# Patient Record
Sex: MALE | Race: BLACK OR AFRICAN AMERICAN | NOT HISPANIC OR LATINO | Employment: OTHER | URBAN - METROPOLITAN AREA
[De-identification: names, ages, dates, MRNs, and addresses within clinical notes are randomized per-mention and may not be internally consistent; named-entity substitution may affect disease eponyms.]

---

## 2021-10-01 ENCOUNTER — OFFICE VISIT (OUTPATIENT)
Dept: UROLOGY | Facility: CLINIC | Age: 71
End: 2021-10-01
Payer: OTHER GOVERNMENT

## 2021-10-01 VITALS — BODY MASS INDEX: 32.07 KG/M2 | HEIGHT: 71 IN | WEIGHT: 229.06 LBS

## 2021-10-01 DIAGNOSIS — N39.8 VOIDING DYSFUNCTION: ICD-10-CM

## 2021-10-01 DIAGNOSIS — N40.1 BPH ASSOCIATED WITH NOCTURIA: Primary | ICD-10-CM

## 2021-10-01 DIAGNOSIS — R35.1 BPH ASSOCIATED WITH NOCTURIA: Primary | ICD-10-CM

## 2021-10-01 DIAGNOSIS — Z12.5 PROSTATE CANCER SCREENING: ICD-10-CM

## 2021-10-01 LAB
BILIRUB SERPL-MCNC: NORMAL MG/DL
BLOOD URINE, POC: NORMAL
CLARITY, POC UA: CLEAR
COLOR, POC UA: YELLOW
GLUCOSE UR QL STRIP: NORMAL
KETONES UR QL STRIP: NORMAL
LEUKOCYTE ESTERASE URINE, POC: NORMAL
NITRITE, POC UA: NORMAL
PH, POC UA: 5
PROTEIN, POC: NORMAL
SPECIFIC GRAVITY, POC UA: 1020
UROBILINOGEN, POC UA: NORMAL

## 2021-10-01 PROCEDURE — 99204 OFFICE O/P NEW MOD 45 MIN: CPT | Mod: PBBFAC | Performed by: STUDENT IN AN ORGANIZED HEALTH CARE EDUCATION/TRAINING PROGRAM

## 2021-10-01 PROCEDURE — 99999 PR PBB SHADOW E&M-NEW PATIENT-LVL IV: ICD-10-PCS | Mod: PBBFAC,,, | Performed by: STUDENT IN AN ORGANIZED HEALTH CARE EDUCATION/TRAINING PROGRAM

## 2021-10-01 PROCEDURE — 81002 URINALYSIS NONAUTO W/O SCOPE: CPT | Mod: PBBFAC | Performed by: STUDENT IN AN ORGANIZED HEALTH CARE EDUCATION/TRAINING PROGRAM

## 2021-10-01 PROCEDURE — 99204 OFFICE O/P NEW MOD 45 MIN: CPT | Mod: S$PBB,,, | Performed by: STUDENT IN AN ORGANIZED HEALTH CARE EDUCATION/TRAINING PROGRAM

## 2021-10-01 PROCEDURE — 99204 PR OFFICE/OUTPT VISIT, NEW, LEVL IV, 45-59 MIN: ICD-10-PCS | Mod: S$PBB,,, | Performed by: STUDENT IN AN ORGANIZED HEALTH CARE EDUCATION/TRAINING PROGRAM

## 2021-10-01 PROCEDURE — 99999 PR PBB SHADOW E&M-NEW PATIENT-LVL IV: CPT | Mod: PBBFAC,,, | Performed by: STUDENT IN AN ORGANIZED HEALTH CARE EDUCATION/TRAINING PROGRAM

## 2021-10-01 RX ORDER — ATORVASTATIN CALCIUM 80 MG/1
80 TABLET, FILM COATED ORAL DAILY
COMMUNITY

## 2021-10-01 RX ORDER — LOSARTAN POTASSIUM 50 MG/1
1 TABLET ORAL
COMMUNITY
Start: 2021-06-15 | End: 2021-10-01 | Stop reason: SDUPTHER

## 2021-10-01 RX ORDER — LOSARTAN POTASSIUM 100 MG/1
100 TABLET ORAL DAILY
COMMUNITY

## 2021-10-01 RX ORDER — ACETAMINOPHEN 500 MG
500 TABLET ORAL 3 TIMES DAILY
COMMUNITY

## 2021-10-01 RX ORDER — DICLOFENAC SODIUM 10 MG/G
2 GEL TOPICAL DAILY
COMMUNITY

## 2021-10-01 RX ORDER — FAMOTIDINE 20 MG/1
20 TABLET, FILM COATED ORAL 2 TIMES DAILY
COMMUNITY

## 2021-10-01 RX ORDER — SODIUM BICARBONATE 650 MG/1
650 TABLET ORAL
COMMUNITY

## 2021-10-01 RX ORDER — TAMSULOSIN HYDROCHLORIDE 0.4 MG/1
1 CAPSULE ORAL DAILY
Status: ON HOLD | COMMUNITY
End: 2022-03-22

## 2021-10-05 ENCOUNTER — HOSPITAL ENCOUNTER (OUTPATIENT)
Dept: RADIOLOGY | Facility: HOSPITAL | Age: 71
Discharge: HOME | End: 2021-10-05
Attending: STUDENT IN AN ORGANIZED HEALTH CARE EDUCATION/TRAINING PROGRAM
Payer: OTHER GOVERNMENT

## 2021-10-05 ENCOUNTER — HOSPITAL ENCOUNTER (OUTPATIENT)
Dept: PREADMISSION TESTING | Facility: HOSPITAL | Age: 71
Discharge: HOME | End: 2021-10-05
Attending: STUDENT IN AN ORGANIZED HEALTH CARE EDUCATION/TRAINING PROGRAM
Payer: OTHER GOVERNMENT

## 2021-10-05 VITALS
RESPIRATION RATE: 18 BRPM | BODY MASS INDEX: 32.16 KG/M2 | WEIGHT: 229.75 LBS | TEMPERATURE: 97 F | HEIGHT: 71 IN | OXYGEN SATURATION: 98 % | DIASTOLIC BLOOD PRESSURE: 79 MMHG | SYSTOLIC BLOOD PRESSURE: 145 MMHG | HEART RATE: 66 BPM

## 2021-10-05 DIAGNOSIS — R35.1 BPH ASSOCIATED WITH NOCTURIA: ICD-10-CM

## 2021-10-05 DIAGNOSIS — Z11.52 ENCOUNTER FOR PREOPERATIVE SCREENING LABORATORY TESTING FOR COVID-19 VIRUS: ICD-10-CM

## 2021-10-05 DIAGNOSIS — Z01.812 ENCOUNTER FOR PREOPERATIVE SCREENING LABORATORY TESTING FOR COVID-19 VIRUS: ICD-10-CM

## 2021-10-05 DIAGNOSIS — N40.1 BPH ASSOCIATED WITH NOCTURIA: ICD-10-CM

## 2021-10-05 LAB — SARS-COV-2 RDRP RESP QL NAA+PROBE: NEGATIVE

## 2021-10-05 PROCEDURE — 76770 US EXAM ABDO BACK WALL COMP: CPT | Mod: 26,,, | Performed by: RADIOLOGY

## 2021-10-05 PROCEDURE — 76770 US RETROPERITONEAL COMPLETE: ICD-10-PCS | Mod: 26,,, | Performed by: RADIOLOGY

## 2021-10-05 PROCEDURE — 76770 US EXAM ABDO BACK WALL COMP: CPT | Mod: TC

## 2021-10-05 PROCEDURE — U0002 COVID-19 LAB TEST NON-CDC: HCPCS | Performed by: STUDENT IN AN ORGANIZED HEALTH CARE EDUCATION/TRAINING PROGRAM

## 2021-10-08 ENCOUNTER — HOSPITAL ENCOUNTER (OUTPATIENT)
Facility: HOSPITAL | Age: 71
Discharge: HOME | End: 2021-10-08
Attending: STUDENT IN AN ORGANIZED HEALTH CARE EDUCATION/TRAINING PROGRAM | Admitting: STUDENT IN AN ORGANIZED HEALTH CARE EDUCATION/TRAINING PROGRAM
Payer: OTHER GOVERNMENT

## 2021-10-08 VITALS
BODY MASS INDEX: 32.04 KG/M2 | OXYGEN SATURATION: 98 % | HEART RATE: 73 BPM | WEIGHT: 229.75 LBS | SYSTOLIC BLOOD PRESSURE: 137 MMHG | TEMPERATURE: 98 F | RESPIRATION RATE: 18 BRPM | DIASTOLIC BLOOD PRESSURE: 72 MMHG

## 2021-10-08 DIAGNOSIS — Z11.52 ENCOUNTER FOR PREOPERATIVE SCREENING LABORATORY TESTING FOR COVID-19 VIRUS: ICD-10-CM

## 2021-10-08 DIAGNOSIS — N39.8 VOIDING DYSFUNCTION: Primary | ICD-10-CM

## 2021-10-08 DIAGNOSIS — N40.1 BPH ASSOCIATED WITH NOCTURIA: ICD-10-CM

## 2021-10-08 DIAGNOSIS — R35.1 BPH ASSOCIATED WITH NOCTURIA: ICD-10-CM

## 2021-10-08 DIAGNOSIS — Z01.812 ENCOUNTER FOR PREOPERATIVE SCREENING LABORATORY TESTING FOR COVID-19 VIRUS: ICD-10-CM

## 2021-10-08 PROCEDURE — 25500020 PHARM REV CODE 255: Performed by: STUDENT IN AN ORGANIZED HEALTH CARE EDUCATION/TRAINING PROGRAM

## 2021-10-08 PROCEDURE — 51600 INJECTION FOR BLADDER X-RAY: CPT | Mod: 51,,, | Performed by: STUDENT IN AN ORGANIZED HEALTH CARE EDUCATION/TRAINING PROGRAM

## 2021-10-08 PROCEDURE — 51728 CYSTOMETROGRAM W/VP: CPT | Mod: 26,,, | Performed by: STUDENT IN AN ORGANIZED HEALTH CARE EDUCATION/TRAINING PROGRAM

## 2021-10-08 PROCEDURE — 51784 ANAL/URINARY MUSCLE STUDY: CPT | Mod: 26,51,, | Performed by: STUDENT IN AN ORGANIZED HEALTH CARE EDUCATION/TRAINING PROGRAM

## 2021-10-08 PROCEDURE — 71000015 HC POSTOP RECOV 1ST HR: Performed by: STUDENT IN AN ORGANIZED HEALTH CARE EDUCATION/TRAINING PROGRAM

## 2021-10-08 PROCEDURE — 74455 X-RAY URETHRA/BLADDER: CPT | Mod: 26,,, | Performed by: STUDENT IN AN ORGANIZED HEALTH CARE EDUCATION/TRAINING PROGRAM

## 2021-10-08 PROCEDURE — 36000706: Performed by: STUDENT IN AN ORGANIZED HEALTH CARE EDUCATION/TRAINING PROGRAM

## 2021-10-08 PROCEDURE — 51600 PR INJECTION FOR BLADDER X-RAY: ICD-10-PCS | Mod: 51,,, | Performed by: STUDENT IN AN ORGANIZED HEALTH CARE EDUCATION/TRAINING PROGRAM

## 2021-10-08 PROCEDURE — 51784 PR ANAL/URINARY MUSCLE STUDY: ICD-10-PCS | Mod: 26,51,, | Performed by: STUDENT IN AN ORGANIZED HEALTH CARE EDUCATION/TRAINING PROGRAM

## 2021-10-08 PROCEDURE — 74455 PR X-RAY URETHROCYSTOGRAM+VOIDING: ICD-10-PCS | Mod: 26,,, | Performed by: STUDENT IN AN ORGANIZED HEALTH CARE EDUCATION/TRAINING PROGRAM

## 2021-10-08 PROCEDURE — 25000003 PHARM REV CODE 250: Performed by: STUDENT IN AN ORGANIZED HEALTH CARE EDUCATION/TRAINING PROGRAM

## 2021-10-08 PROCEDURE — 51728 PR COMPLEX CYSTOMETROGRAM VOIDING PRESSURE STUDIES: ICD-10-PCS | Mod: 26,,, | Performed by: STUDENT IN AN ORGANIZED HEALTH CARE EDUCATION/TRAINING PROGRAM

## 2021-10-08 PROCEDURE — 36000707: Performed by: STUDENT IN AN ORGANIZED HEALTH CARE EDUCATION/TRAINING PROGRAM

## 2021-10-08 RX ORDER — CEFDINIR 300 MG/1
300 CAPSULE ORAL EVERY 12 HOURS
Qty: 4 CAPSULE | Refills: 0 | Status: SHIPPED | OUTPATIENT
Start: 2021-10-08 | End: 2021-10-10

## 2021-10-08 RX ORDER — LIDOCAINE HYDROCHLORIDE 20 MG/ML
JELLY TOPICAL
Status: DISCONTINUED | OUTPATIENT
Start: 2021-10-08 | End: 2021-10-08 | Stop reason: HOSPADM

## 2021-10-08 RX ORDER — SODIUM CHLORIDE 9 MG/ML
INJECTION, SOLUTION INTRAVENOUS
Status: COMPLETED | OUTPATIENT
Start: 2021-10-08 | End: 2021-10-08

## 2021-10-11 ENCOUNTER — TELEPHONE (OUTPATIENT)
Dept: UROLOGY | Facility: CLINIC | Age: 71
End: 2021-10-11

## 2022-03-02 ENCOUNTER — OFFICE VISIT (OUTPATIENT)
Dept: UROLOGY | Facility: CLINIC | Age: 72
End: 2022-03-02
Payer: OTHER GOVERNMENT

## 2022-03-02 VITALS — WEIGHT: 229.75 LBS | BODY MASS INDEX: 32.16 KG/M2 | HEIGHT: 71 IN

## 2022-03-02 DIAGNOSIS — R35.1 BPH ASSOCIATED WITH NOCTURIA: Primary | ICD-10-CM

## 2022-03-02 DIAGNOSIS — N40.1 BPH ASSOCIATED WITH NOCTURIA: Primary | ICD-10-CM

## 2022-03-02 PROCEDURE — 99213 PR OFFICE/OUTPT VISIT, EST, LEVL III, 20-29 MIN: ICD-10-PCS | Mod: S$PBB,,, | Performed by: STUDENT IN AN ORGANIZED HEALTH CARE EDUCATION/TRAINING PROGRAM

## 2022-03-02 PROCEDURE — 99999 PR PBB SHADOW E&M-EST. PATIENT-LVL V: ICD-10-PCS | Mod: PBBFAC,,, | Performed by: STUDENT IN AN ORGANIZED HEALTH CARE EDUCATION/TRAINING PROGRAM

## 2022-03-02 PROCEDURE — 99999 PR PBB SHADOW E&M-EST. PATIENT-LVL V: CPT | Mod: PBBFAC,,, | Performed by: STUDENT IN AN ORGANIZED HEALTH CARE EDUCATION/TRAINING PROGRAM

## 2022-03-02 PROCEDURE — 99215 OFFICE O/P EST HI 40 MIN: CPT | Mod: PBBFAC | Performed by: STUDENT IN AN ORGANIZED HEALTH CARE EDUCATION/TRAINING PROGRAM

## 2022-03-02 PROCEDURE — 99213 OFFICE O/P EST LOW 20 MIN: CPT | Mod: S$PBB,,, | Performed by: STUDENT IN AN ORGANIZED HEALTH CARE EDUCATION/TRAINING PROGRAM

## 2022-03-02 NOTE — PATIENT INSTRUCTIONS
-Prostatic Urethral Lift (PUL)- also known as UroLift    PUL uses a needle to place tiny implants in the prostate. These implants lift and compresses the enlarged prostate so that it no longer blocks the urethra.   PUL uses no cutting or heat to destroy or remove prostate tissue. It takes less than an hour and you can usually go home the same day. Most men see symptom improvement within about two weeks. Flow rates tend to be better with TURP; however, men who undergo PUL tend to be very pleased with the less invasive procedure.    Some men may have pain or burning when passing urine, blood in the urine or a strong urge to pass urine. These side effects usually go away within two to four weeks.     Men with many medical problems may be good candidates. Men for whom surgery is high-risk may also be good candidates. Many men with enlarged prostates and urinary symptoms may be good candidates for PUL. Men who have PUL can still have other treatment if they need it, including MRI, TURP, etc. If you are allergic to nickel, titanium, or stainless steel, talk to your doctor before getting PUL. Current studies have evaluated seven years of treatment with PUL and future studies may help to determine long term durability.    -Transurethral Resection of the Prostate (TURP)    TURP is also a very common surgery for BPH. After anesthesia, the surgeon inserts a thin, tube-like instrument (a resectoscope) through the tip of the penis into the urethra. The resectoscope has a light, valves for irrigating fluid and a thin wire loop. An electrical current is passed along the wire. The surgeon uses the electrified wire to cut away prostate tissue that is blocking the urethra and seal blood vessels. The removed tissue is flushed into the bladder and from there out of the body. You will need to use a catheter for one to two days after the procedure.      -Transurethral Resection of the Prostate (TURP)    This treatment has well known  long-term outcomes. Other treatments are generally compared with it. Symptoms generally improve markedly. The effects of treatment last for 15 years or more.  TURP does not remove the entire prostate- it is often compared to removing the flesh of an orange from its rind by coring it out into smaller pieces that can be evacuated from the bladder through the penis. The hospital stay is one day or until there is no significant blood in your urine. There is a saldana that remains in place for several days post procedure, followed by a trial of voiding.     Side effects of TURP may include retrograde ejaculation and urinary incontinence. Full recovery of urinary function takes several weeks. Men who require surgery because of moderate to severe BPH symptoms may be good candidates for TURP.    -Holmium Laser Enucleation of Prostate (HoLEP)    In HoLEP, the surgeon places a thin, tube-like instrument (a resectoscope) through the penis into the urethra. A laser inserted into the resectoscope to core out the prostate tissue, this procedure removes the most amount of prostatic tissue compared to the TURP procedure.  You may only need to stay one night in the hospital. There is very little bleeding, an excellent option for patients on blood thinners. A catheter is used, but it is usually removed the next day. You may have blood in your urine or frequent or painful urination for a few days. There is a period where men may have loss of control of  urinary control for a few weeks- requiring use of a pad- but this is temporary.     Men with larger prostates who wish to avoid more-invasive surgery may be good candidates for this treatment. The side effects are similar to that of the TURP procedure.

## 2022-03-02 NOTE — H&P (VIEW-ONLY)
Patient ID: Ephraim Coyle is a 71 y.o. male.    Chief Complaint: Follow-up      HPI- Interval  71 y.o. who presents to the Urology clinic for evaluation of BPH/LUTS. Patient has previously undergone UDS with findings of Early sensation, low bladder capacity, detrusor overactivity with incontinence, normal compliance, bladder outlet obstruction due to enlarged prostate, incomplete bladder emptying, decreased urinary flow. Patient previously recommended to undergo bladder outlet obstruction alleviation with consideration of antibladder spasm medication if needed post operatively. Patient notes urinary urgency which is bothersome.    PSA due    Under the care of Dr. Garrett Sotelo, Cardiology      Medically Necessary ROS documented in HPI    HPI 10/1/21  70 y.o. who presents to the Urology clinic for evaluation of urinary frequency, urgency, nocturia for the past 8 years. Patient denies UTIs, hematuria, flank pain. Patient reports FH of prostate in great uncle. Patient drinks 5/6 bottles of water daily, 1-2 bottles of pepsi daily. Denies alcohol intake. Denies dysuria. Denies constipation.  IPSS 18 ( [id number]), terrible QOL. Reports ED, ASA 11, low confidence to attain an erection.     Past Medical History  Active Ambulatory Problems     Diagnosis Date Noted    Voiding dysfunction 10/08/2021     Resolved Ambulatory Problems     Diagnosis Date Noted    No Resolved Ambulatory Problems     Past Medical History:   Diagnosis Date    Acid reflux     Hyperlipidemia     Hypertension     RAQUEL on CPAP     Sleep apnea          Past Surgical History  Past Surgical History:   Procedure Laterality Date    CYSTOSCOPY WITH URODYNAMIC TESTING N/A 10/8/2021    Procedure: CYSTOSCOPY, WITH URODYNAMIC TESTING;  Surgeon: Marine Kenny MD;  Location: [facility] OR;  Service: Urology;  Laterality: N/A;  URO TECH DOREEN BOOTH TEXTED  HER @ 1:13PM ON 10-6-2021  RN PREOP 10/5---COVID 10/5---NEGATIVE       Social History  Social Connections:  Not on file       Medications    Current Outpatient Medications:     acetaminophen (TYLENOL) 500 MG tablet, Take 500 mg by mouth 3 (three) times daily., Disp: , Rfl:     AMLODIPINE BESYLATE, BULK, MISC, 10 mg by Misc.(Non-Drug; Combo Route) route once. 1 tablet by mouth once daily, Disp: , Rfl:     atorvastatin (LIPITOR) 80 MG tablet, Take 80 mg by mouth once daily., Disp: , Rfl:     diclofenac sodium (VOLTAREN) 1 % Gel, Apply 2 g topically once daily., Disp: , Rfl:     famotidine (PEPCID) 20 MG tablet, Take 20 mg by mouth 2 (two) times daily., Disp: , Rfl:     losartan (COZAAR) 100 MG tablet, Take 100 mg by mouth once daily. Take one half tablet by mouth, Disp: , Rfl:     sodium bicarbonate 650 MG tablet, Take 650 mg by mouth every 24 hours as needed for Heartburn., Disp: , Rfl:     tamsulosin (FLOMAX) 0.4 mg Cap, Take 1 capsule by mouth once daily., Disp: , Rfl:     Allergies  Review of patient's allergies indicates:  No Known Allergies    Patient's PMH, FH, Social hx, Medications, allergies reviewed and updated as pertinent to today's visit    Objective:      Physical Exam  Vitals reviewed.   Constitutional:       General: He is not in acute distress.     Appearance: He is well-developed. He is not ill-appearing, toxic-appearing or diaphoretic.   HENT:      Head: Normocephalic and atraumatic.      Mouth/Throat:      Mouth: Mucous membranes are moist.   Eyes:      Conjunctiva/sclera: Conjunctivae normal.   Pulmonary:      Effort: Pulmonary effort is normal. No respiratory distress.   Abdominal:      General: Abdomen is flat. There is no distension.      Palpations: Abdomen is soft.      Tenderness: There is no right CVA tenderness or left CVA tenderness.   Musculoskeletal:         General: No swelling or deformity.      Cervical back: Neck supple.   Skin:     General: Skin is warm.      Capillary Refill: Capillary refill takes less than 2 seconds.      Findings: No rash.   Neurological:      Mental Status:  He is alert and oriented to person, place, and time.      Gait: Gait normal.   Psychiatric:         Mood and Affect: Mood normal.         Thought Content: Thought content normal.         Judgment: Judgment normal.               Assessment:       1. BPH associated with nocturia        Plan:       Patient with medication refractory BPH  Dicussed finasteride, etc options to shrink the prostate but associated with undesirable sexual side effects, post finasteride syndrome etc.   PSA due  if elevated will consider MRI of prostate vs prostate biopsy to ensure malignancy of the prostate is not present.    Discussed bladder outlet procedure options  ( TURP, HoLEP, Urolift, Simple prostatectomy etc). Educational pamphlet provided.  Patient elected for the most definitive surgery option that will allow him to be catheter free the soonest, HoLEP.     Discussed risk of bleeding, infection, damage to surrouding structures  Patient to follow up for a voiding trial 1-2 days after procedure.   Discussed risk of urgency incontinence and to a lesser degree stress incontinence for up to 6-8 weeks post procedure which resolves spontaneously or with the assistance of PFPT    Will need clearance from Garrett Sotelo MD, Cardiologist

## 2022-03-02 NOTE — PROGRESS NOTES
Patient ID: Ephraim Coyle is a 71 y.o. male.    Chief Complaint: Follow-up      HPI- Interval  71 y.o. who presents to the Urology clinic for evaluation of BPH/LUTS. Patient has previously undergone UDS with findings of Early sensation, low bladder capacity, detrusor overactivity with incontinence, normal compliance, bladder outlet obstruction due to enlarged prostate, incomplete bladder emptying, decreased urinary flow. Patient previously recommended to undergo bladder outlet obstruction alleviation with consideration of antibladder spasm medication if needed post operatively. Patient notes urinary urgency which is bothersome.    PSA due    Under the care of Dr. Garrett Sotelo, Cardiology      Medically Necessary ROS documented in HPI    HPI 10/1/21  70 y.o. who presents to the Urology clinic for evaluation of urinary frequency, urgency, nocturia for the past 8 years. Patient denies UTIs, hematuria, flank pain. Patient reports FH of prostate in great uncle. Patient drinks 5/6 bottles of water daily, 1-2 bottles of pepsi daily. Denies alcohol intake. Denies dysuria. Denies constipation.  IPSS 18 ( [id number]), terrible QOL. Reports ED, ASA 11, low confidence to attain an erection.     Past Medical History  Active Ambulatory Problems     Diagnosis Date Noted    Voiding dysfunction 10/08/2021     Resolved Ambulatory Problems     Diagnosis Date Noted    No Resolved Ambulatory Problems     Past Medical History:   Diagnosis Date    Acid reflux     Hyperlipidemia     Hypertension     RAQUEL on CPAP     Sleep apnea          Past Surgical History  Past Surgical History:   Procedure Laterality Date    CYSTOSCOPY WITH URODYNAMIC TESTING N/A 10/8/2021    Procedure: CYSTOSCOPY, WITH URODYNAMIC TESTING;  Surgeon: Marine Kenny MD;  Location: [facility] OR;  Service: Urology;  Laterality: N/A;  URO TECH DOREEN BOOTH TEXTED  HER @ 1:13PM ON 10-6-2021  RN PREOP 10/5---COVID 10/5---NEGATIVE       Social History  Social Connections:  Not on file       Medications    Current Outpatient Medications:     acetaminophen (TYLENOL) 500 MG tablet, Take 500 mg by mouth 3 (three) times daily., Disp: , Rfl:     AMLODIPINE BESYLATE, BULK, MISC, 10 mg by Misc.(Non-Drug; Combo Route) route once. 1 tablet by mouth once daily, Disp: , Rfl:     atorvastatin (LIPITOR) 80 MG tablet, Take 80 mg by mouth once daily., Disp: , Rfl:     diclofenac sodium (VOLTAREN) 1 % Gel, Apply 2 g topically once daily., Disp: , Rfl:     famotidine (PEPCID) 20 MG tablet, Take 20 mg by mouth 2 (two) times daily., Disp: , Rfl:     losartan (COZAAR) 100 MG tablet, Take 100 mg by mouth once daily. Take one half tablet by mouth, Disp: , Rfl:     sodium bicarbonate 650 MG tablet, Take 650 mg by mouth every 24 hours as needed for Heartburn., Disp: , Rfl:     tamsulosin (FLOMAX) 0.4 mg Cap, Take 1 capsule by mouth once daily., Disp: , Rfl:     Allergies  Review of patient's allergies indicates:  No Known Allergies    Patient's PMH, FH, Social hx, Medications, allergies reviewed and updated as pertinent to today's visit    Objective:      Physical Exam  Vitals reviewed.   Constitutional:       General: He is not in acute distress.     Appearance: He is well-developed. He is not ill-appearing, toxic-appearing or diaphoretic.   HENT:      Head: Normocephalic and atraumatic.      Mouth/Throat:      Mouth: Mucous membranes are moist.   Eyes:      Conjunctiva/sclera: Conjunctivae normal.   Pulmonary:      Effort: Pulmonary effort is normal. No respiratory distress.   Abdominal:      General: Abdomen is flat. There is no distension.      Palpations: Abdomen is soft.      Tenderness: There is no right CVA tenderness or left CVA tenderness.   Musculoskeletal:         General: No swelling or deformity.      Cervical back: Neck supple.   Skin:     General: Skin is warm.      Capillary Refill: Capillary refill takes less than 2 seconds.      Findings: No rash.   Neurological:      Mental Status:  He is alert and oriented to person, place, and time.      Gait: Gait normal.   Psychiatric:         Mood and Affect: Mood normal.         Thought Content: Thought content normal.         Judgment: Judgment normal.               Assessment:       1. BPH associated with nocturia        Plan:       Patient with medication refractory BPH  Dicussed finasteride, etc options to shrink the prostate but associated with undesirable sexual side effects, post finasteride syndrome etc.   PSA due  if elevated will consider MRI of prostate vs prostate biopsy to ensure malignancy of the prostate is not present.    Discussed bladder outlet procedure options  ( TURP, HoLEP, Urolift, Simple prostatectomy etc). Educational pamphlet provided.  Patient elected for the most definitive surgery option that will allow him to be catheter free the soonest, HoLEP.     Discussed risk of bleeding, infection, damage to surrouding structures  Patient to follow up for a voiding trial 1-2 days after procedure.   Discussed risk of urgency incontinence and to a lesser degree stress incontinence for up to 6-8 weeks post procedure which resolves spontaneously or with the assistance of PFPT    Will need clearance from Garrett Sotelo MD, Cardiologist

## 2022-03-03 ENCOUNTER — LAB VISIT (OUTPATIENT)
Dept: LAB | Facility: HOSPITAL | Age: 72
End: 2022-03-03
Attending: STUDENT IN AN ORGANIZED HEALTH CARE EDUCATION/TRAINING PROGRAM
Payer: OTHER GOVERNMENT

## 2022-03-03 DIAGNOSIS — R35.1 BPH ASSOCIATED WITH NOCTURIA: ICD-10-CM

## 2022-03-03 DIAGNOSIS — N40.1 BPH ASSOCIATED WITH NOCTURIA: ICD-10-CM

## 2022-03-03 PROCEDURE — 87086 URINE CULTURE/COLONY COUNT: CPT | Performed by: STUDENT IN AN ORGANIZED HEALTH CARE EDUCATION/TRAINING PROGRAM

## 2022-03-05 LAB — BACTERIA UR CULT: NORMAL

## 2022-03-14 ENCOUNTER — TELEPHONE (OUTPATIENT)
Dept: UROLOGY | Facility: CLINIC | Age: 72
End: 2022-03-14
Payer: OTHER GOVERNMENT

## 2022-03-14 ENCOUNTER — TELEPHONE (OUTPATIENT)
Dept: UROLOGY | Facility: HOSPITAL | Age: 72
End: 2022-03-14
Payer: OTHER GOVERNMENT

## 2022-03-14 NOTE — TELEPHONE ENCOUNTER
I faxed over the request with Dr. Kenny's signature granting the request to extend authorization. The first faxed was sent over on 3/14/22

## 2022-03-14 NOTE — TELEPHONE ENCOUNTER
I spoke with Mr. Ye and he states that he has an appointment on 3/15/22 to receive clearance from cardio. He is also completing his labs on 3/15/22 along with his pre-op.

## 2022-03-15 ENCOUNTER — TELEPHONE (OUTPATIENT)
Dept: UROLOGY | Facility: HOSPITAL | Age: 72
End: 2022-03-15
Payer: OTHER GOVERNMENT

## 2022-03-15 ENCOUNTER — LAB VISIT (OUTPATIENT)
Dept: LAB | Facility: HOSPITAL | Age: 72
End: 2022-03-15
Attending: STUDENT IN AN ORGANIZED HEALTH CARE EDUCATION/TRAINING PROGRAM
Payer: OTHER GOVERNMENT

## 2022-03-15 DIAGNOSIS — R35.1 BPH ASSOCIATED WITH NOCTURIA: ICD-10-CM

## 2022-03-15 DIAGNOSIS — N40.1 BPH ASSOCIATED WITH NOCTURIA: ICD-10-CM

## 2022-03-15 LAB — COMPLEXED PSA SERPL-MCNC: 1.4 NG/ML (ref 0–4)

## 2022-03-15 PROCEDURE — 84153 ASSAY OF PSA TOTAL: CPT | Performed by: STUDENT IN AN ORGANIZED HEALTH CARE EDUCATION/TRAINING PROGRAM

## 2022-03-15 PROCEDURE — 36415 COLL VENOUS BLD VENIPUNCTURE: CPT | Performed by: STUDENT IN AN ORGANIZED HEALTH CARE EDUCATION/TRAINING PROGRAM

## 2022-03-15 NOTE — TELEPHONE ENCOUNTER
Attempted to reach patient to inform him that he is out of network, left VM  The billing dept has also reached out patient as well

## 2022-03-21 ENCOUNTER — ANESTHESIA EVENT (OUTPATIENT)
Dept: SURGERY | Facility: HOSPITAL | Age: 72
End: 2022-03-21
Payer: OTHER GOVERNMENT

## 2022-03-21 ENCOUNTER — HOSPITAL ENCOUNTER (OUTPATIENT)
Dept: PREADMISSION TESTING | Facility: HOSPITAL | Age: 72
Discharge: HOME | End: 2022-03-21
Attending: STUDENT IN AN ORGANIZED HEALTH CARE EDUCATION/TRAINING PROGRAM
Payer: MEDICARE

## 2022-03-21 VITALS
OXYGEN SATURATION: 96 % | DIASTOLIC BLOOD PRESSURE: 83 MMHG | SYSTOLIC BLOOD PRESSURE: 136 MMHG | WEIGHT: 235.69 LBS | TEMPERATURE: 97 F | BODY MASS INDEX: 33 KG/M2 | HEART RATE: 76 BPM | HEIGHT: 71 IN | RESPIRATION RATE: 16 BRPM

## 2022-03-21 DIAGNOSIS — N40.0 BPH (BENIGN PROSTATIC HYPERPLASIA): ICD-10-CM

## 2022-03-21 DIAGNOSIS — N40.1 BPH ASSOCIATED WITH NOCTURIA: ICD-10-CM

## 2022-03-21 DIAGNOSIS — Z01.818 PREOP TESTING: Primary | ICD-10-CM

## 2022-03-21 DIAGNOSIS — R35.1 BPH ASSOCIATED WITH NOCTURIA: ICD-10-CM

## 2022-03-21 LAB
ABO + RH BLD: NORMAL
ANION GAP SERPL CALC-SCNC: 4 MMOL/L (ref 8–16)
BASOPHILS # BLD AUTO: 0.04 K/UL (ref 0–0.2)
BASOPHILS NFR BLD: 0.8 % (ref 0–1.9)
BLD GP AB SCN CELLS X3 SERPL QL: NORMAL
BUN SERPL-MCNC: 24 MG/DL (ref 8–23)
CALCIUM SERPL-MCNC: 9.6 MG/DL (ref 8.7–10.5)
CHLORIDE SERPL-SCNC: 106 MMOL/L (ref 95–110)
CO2 SERPL-SCNC: 27 MMOL/L (ref 23–29)
CREAT SERPL-MCNC: 1.7 MG/DL (ref 0.5–1.4)
DIFFERENTIAL METHOD: ABNORMAL
EOSINOPHIL # BLD AUTO: 0.3 K/UL (ref 0–0.5)
EOSINOPHIL NFR BLD: 4.8 % (ref 0–8)
ERYTHROCYTE [DISTWIDTH] IN BLOOD BY AUTOMATED COUNT: 15.3 % (ref 11.5–14.5)
EST. GFR  (AFRICAN AMERICAN): 45 ML/MIN/1.73 M^2
EST. GFR  (NON AFRICAN AMERICAN): 39 ML/MIN/1.73 M^2
GLUCOSE SERPL-MCNC: 102 MG/DL (ref 70–110)
HCT VFR BLD AUTO: 45.2 % (ref 40–54)
HGB BLD-MCNC: 13.8 G/DL (ref 14–18)
IMM GRANULOCYTES # BLD AUTO: 0.01 K/UL (ref 0–0.04)
IMM GRANULOCYTES NFR BLD AUTO: 0.2 % (ref 0–0.5)
LYMPHOCYTES # BLD AUTO: 2.5 K/UL (ref 1–4.8)
LYMPHOCYTES NFR BLD: 48.4 % (ref 18–48)
MCH RBC QN AUTO: 22.2 PG (ref 27–31)
MCHC RBC AUTO-ENTMCNC: 30.5 G/DL (ref 32–36)
MCV RBC AUTO: 73 FL (ref 82–98)
MONOCYTES # BLD AUTO: 0.5 K/UL (ref 0.3–1)
MONOCYTES NFR BLD: 8.9 % (ref 4–15)
NEUTROPHILS # BLD AUTO: 1.9 K/UL (ref 1.8–7.7)
NEUTROPHILS NFR BLD: 36.9 % (ref 38–73)
NRBC BLD-RTO: 0 /100 WBC
PLATELET # BLD AUTO: 292 K/UL (ref 150–450)
PMV BLD AUTO: 9.4 FL (ref 9.2–12.9)
POTASSIUM SERPL-SCNC: 4.3 MMOL/L (ref 3.5–5.1)
RBC # BLD AUTO: 6.21 M/UL (ref 4.6–6.2)
SODIUM SERPL-SCNC: 137 MMOL/L (ref 136–145)
WBC # BLD AUTO: 5.17 K/UL (ref 3.9–12.7)

## 2022-03-21 PROCEDURE — 93010 ELECTROCARDIOGRAM REPORT: CPT | Mod: ,,, | Performed by: INTERNAL MEDICINE

## 2022-03-21 PROCEDURE — 80048 BASIC METABOLIC PNL TOTAL CA: CPT | Performed by: STUDENT IN AN ORGANIZED HEALTH CARE EDUCATION/TRAINING PROGRAM

## 2022-03-21 PROCEDURE — 85025 COMPLETE CBC W/AUTO DIFF WBC: CPT | Performed by: STUDENT IN AN ORGANIZED HEALTH CARE EDUCATION/TRAINING PROGRAM

## 2022-03-21 PROCEDURE — 93010 EKG 12-LEAD: ICD-10-PCS | Mod: ,,, | Performed by: INTERNAL MEDICINE

## 2022-03-21 PROCEDURE — 93005 ELECTROCARDIOGRAM TRACING: CPT

## 2022-03-21 PROCEDURE — 36415 COLL VENOUS BLD VENIPUNCTURE: CPT | Performed by: STUDENT IN AN ORGANIZED HEALTH CARE EDUCATION/TRAINING PROGRAM

## 2022-03-21 PROCEDURE — 86901 BLOOD TYPING SEROLOGIC RH(D): CPT | Performed by: STUDENT IN AN ORGANIZED HEALTH CARE EDUCATION/TRAINING PROGRAM

## 2022-03-21 RX ORDER — ALLOPURINOL 100 MG/1
100 TABLET ORAL DAILY
COMMUNITY
End: 2023-03-22 | Stop reason: SDUPTHER

## 2022-03-21 NOTE — DISCHARGE INSTRUCTIONS
BATHING/DRESSING:  Ok to shower tomorrow    ACTIVITY LEVEL: If you have received sedation or an anesthetic, you may feel sleepy for several hours. Rest until you are more awake. Gradually resume your normal activities.   No heavy lifting.      DIET: You may resume your home diet. If nausea is present, increase your diet gradually with fluids and bland foods.    Medications: Pain medication should be taken only if needed and as directed. If antibiotics are prescribed, the medication should be taken until completed. You will be given an updated list of you medications.    No driving, alcoholic beverages or signing legal documents for next 24 hours or while taking pain medication    -Stop taking flomax   -Drink at least 4 bottles of water daily for the next 4-6 weeks   -Avoid constipation for at least 1 week with prescribed miralax     -Urinary urgency is anticipate part of recovery, some patients opt to wear a protective pad to guard against accidents   -Oxybutynin has been prescribed to help with urinary urgency   -Avoid known bladder irritants while your urinary tract is recovering for the next 4-6 weeks   Coffee - Regular & Decaf   Tea - caffeinated   Carbonated beverages - cola, non-randi, diet & caffeine-free   Alcohols - Beer, Red Wine, White Wine, Champagne   Fruits - Grapefruit, Lemon, Orange, Pineapple   Fruit Juices - Cranberry, Grapefruit, Orange, Pineapple   Vegetables - Tomato & Tomato Products   Flavor Enhancers - Hot peppers, Spicy foods, Chili, Horseradish, Vinegar, Monosodium glutamate (MSG)   Artificial Sweeteners - NutraSweet, Sweet 'N Low, Equal (sweetener), Saccharin     CALL THE DOCTOR:   For any obvious bleeding (some dried blood over the incision is normal).   Some blood in your urine is normal.    Redness, swelling, foul smell around incision or fever over 101.  Shortness of breath, Coughing Up Bloody Sputum, or Pains or Swelling in your Calves..  Persistent pain or nausea not relieved by  medication.  Problems urinating - unable to urinate or heavy bleeding (with our without clots) in urine.    If any unusual problems or difficulties occur contact your doctor. If you cannot contact your doctor but feel your signs and symptoms warrant a physicians attention return to the emergency room.     Fall Prevention  Millions of people fall every year and injure themselves. You may have had anesthesia or sedation which may increase your risk of falling. You may have health issues that put you at an increased risk of falling.     Here are ways to reduce your risk of falling.    Make your home safe by keeping walkways clear of objects you may trip over.  Use non-slip pads under rugs. Do not use area rugs or small throw rugs.  Use non-slip mats in bathtubs and showers.  Install handrails and lights on staircases.  Do not walk in poorly lit areas.  Do not stand on chairs or wobbly ladders.  Use caution when reaching overhead or looking upward. This position can cause a loss of balance.  Be sure your shoes fit properly, have non-slip bottoms and are in good condition.   Wear shoes both inside and out. Avoid going barefoot or wearing slippers.  Be cautious when going up and down stairs, curbs, and when walking on uneven sidewalks.  If your balance is poor, consider using a cane or walker.  If your fall was related to alcohol use, stop or limit alcohol intake.   If your fall was related to use of sleeping medicines, talk to your doctor about this. You may need to reduce your dosage at bedtime if you awaken during the night to go to the bathroom.    To reduce the need for nighttime bathroom trips:  Avoid drinking fluids for several hours before going to bed  Empty your bladder before going to bed  Men can keep a urinal at the bedside  Stay as active as you can. Balance, flexibility, strength, and endurance all come from exercise. They all play a role in preventing falls. Ask your healthcare provider which types of  activity are right for you.  Get your vision checked on a regular basis.  If you have pets, know where they are before you stand up or walk so you don't trip over them.  Use night lights.

## 2022-03-21 NOTE — ANESTHESIA PREPROCEDURE EVALUATION
03/21/2022  Ephraim Coyle is a 71 y.o., male scheduled for ENUCLEATION, PROSTATE, USING LASER (N/A Perineum) on 3/22/2022.       Past Medical History:   Diagnosis Date    Acid reflux     H/O colonoscopy     Hyperlipidemia     Hypertension     RAQUEL on CPAP     at times    Sleep apnea          Past Surgical History:   Procedure Laterality Date    CYSTOSCOPY WITH URODYNAMIC TESTING N/A 10/8/2021    Procedure: CYSTOSCOPY, WITH URODYNAMIC TESTING;  Surgeon: Marine Kenny MD;  Location: [facility];  Service: Urology;  Laterality: N/A;  URO TECH DOREEN BOOTH TEXTED  HER @ 1:13PM ON 10-6-2021  RN PREOP 10/5---COVID 10/5---NEGATIVE         Pre-op Assessment    I have reviewed the Patient Summary Reports.     I have reviewed the Nursing Notes. I have reviewed the NPO Status.   I have reviewed the Medications.     Review of Systems  Anesthesia Hx:  No problems with previous Anesthesia  Neg history of prior surgery. Denies Family Hx of Anesthesia complications.   Denies Personal Hx of Anesthesia complications.   Social:  No Alcohol Use, Former Smoker    Hematology/Oncology:  Hematology Normal   Oncology Normal     EENT/Dental:EENT/Dental Normal   Cardiovascular:   Exercise tolerance: good Hypertension Valvular problems/Murmurs, AI, MR  Denies Angina. hyperlipidemia     ECG has been reviewed.  Functional Capacity good / => 4 METS  Valvular Heart Disease: Aortic Regurgitation (AR), moderate     Pulmonary:   Sleep Apnea    Education provided regarding risk of obstructive sleep apnea     Renal/:   BPH    Hepatic/GI:   GERD    Musculoskeletal:  Musculoskeletal Normal    Neurological:  Neurology Normal    Endocrine:  Endocrine Normal    Dermatological:  Skin Normal    Psych:  Psychiatric Normal           Physical Exam  General: Well nourished    Airway:  Mallampati: II   Mouth Opening: Normal  TM Distance:  Normal  Tongue: Normal    Dental:    Chest/Lungs:  Normal Respiratory Rate    Heart:  Rate: Normal  Rhythm: Regular Rhythm  Sounds: Normal    Abdomen:  Normal        Anesthesia Plan  Type of Anesthesia, risks & benefits discussed:    Anesthesia Type: Gen ETT  Intra-op Monitoring Plan: Standard ASA Monitors  Post Op Pain Control Plan: multimodal analgesia  Airway Plan: Direct  Informed Consent: Patient consented to blood products? Yes  ASA Score: 3    Ready For Surgery From Anesthesia Perspective.     .

## 2022-03-21 NOTE — PRE ADMISSION SCREENING
Preop communication:    Awaiting pt cardiac clearance note to be faxed from Dr. LIVE Sotelo.  I left 2 messages for his office staff to call me back.  [phone].  No reply yet.    ANNIE May NP, Dr. Kenny and Alyson Vee MA, were informed.

## 2022-03-22 ENCOUNTER — ANESTHESIA (OUTPATIENT)
Dept: SURGERY | Facility: HOSPITAL | Age: 72
End: 2022-03-22
Payer: OTHER GOVERNMENT

## 2022-03-22 ENCOUNTER — HOSPITAL ENCOUNTER (OUTPATIENT)
Facility: HOSPITAL | Age: 72
Discharge: HOME | End: 2022-03-22
Attending: STUDENT IN AN ORGANIZED HEALTH CARE EDUCATION/TRAINING PROGRAM | Admitting: STUDENT IN AN ORGANIZED HEALTH CARE EDUCATION/TRAINING PROGRAM
Payer: OTHER GOVERNMENT

## 2022-03-22 VITALS
RESPIRATION RATE: 18 BRPM | SYSTOLIC BLOOD PRESSURE: 168 MMHG | DIASTOLIC BLOOD PRESSURE: 90 MMHG | HEART RATE: 70 BPM | TEMPERATURE: 98 F | OXYGEN SATURATION: 96 %

## 2022-03-22 DIAGNOSIS — N40.1 BPH ASSOCIATED WITH NOCTURIA: Primary | ICD-10-CM

## 2022-03-22 DIAGNOSIS — R35.1 BPH ASSOCIATED WITH NOCTURIA: Primary | ICD-10-CM

## 2022-03-22 PROCEDURE — 71000039 HC RECOVERY, EACH ADD'L HOUR: Performed by: STUDENT IN AN ORGANIZED HEALTH CARE EDUCATION/TRAINING PROGRAM

## 2022-03-22 PROCEDURE — 88305 TISSUE EXAM BY PATHOLOGIST: CPT | Mod: 26,,, | Performed by: PATHOLOGY

## 2022-03-22 PROCEDURE — 71000015 HC POSTOP RECOV 1ST HR: Performed by: STUDENT IN AN ORGANIZED HEALTH CARE EDUCATION/TRAINING PROGRAM

## 2022-03-22 PROCEDURE — 00914 ANES TRURL PX RESCJ PRST8: CPT | Performed by: STUDENT IN AN ORGANIZED HEALTH CARE EDUCATION/TRAINING PROGRAM

## 2022-03-22 PROCEDURE — 71000033 HC RECOVERY, INTIAL HOUR: Performed by: STUDENT IN AN ORGANIZED HEALTH CARE EDUCATION/TRAINING PROGRAM

## 2022-03-22 PROCEDURE — 71000016 HC POSTOP RECOV ADDL HR: Performed by: STUDENT IN AN ORGANIZED HEALTH CARE EDUCATION/TRAINING PROGRAM

## 2022-03-22 PROCEDURE — C1758 CATHETER, URETERAL: HCPCS | Performed by: STUDENT IN AN ORGANIZED HEALTH CARE EDUCATION/TRAINING PROGRAM

## 2022-03-22 PROCEDURE — 63600175 PHARM REV CODE 636 W HCPCS: Performed by: NURSE ANESTHETIST, CERTIFIED REGISTERED

## 2022-03-22 PROCEDURE — 37000008 HC ANESTHESIA 1ST 15 MINUTES: Performed by: STUDENT IN AN ORGANIZED HEALTH CARE EDUCATION/TRAINING PROGRAM

## 2022-03-22 PROCEDURE — 25000003 PHARM REV CODE 250: Performed by: ANESTHESIOLOGY

## 2022-03-22 PROCEDURE — 63600175 PHARM REV CODE 636 W HCPCS: Performed by: ANESTHESIOLOGY

## 2022-03-22 PROCEDURE — 27201423 OPTIME MED/SURG SUP & DEVICES STERILE SUPPLY: Performed by: STUDENT IN AN ORGANIZED HEALTH CARE EDUCATION/TRAINING PROGRAM

## 2022-03-22 PROCEDURE — SPCCPT FACILITY SINGLE PATH SOFT-CODING CPT: Mod: SPCCPT | Performed by: STUDENT IN AN ORGANIZED HEALTH CARE EDUCATION/TRAINING PROGRAM

## 2022-03-22 PROCEDURE — 52649 PR LASER ENUCLEATION PROSTATE W MORCELLATION: ICD-10-PCS | Mod: ,,, | Performed by: STUDENT IN AN ORGANIZED HEALTH CARE EDUCATION/TRAINING PROGRAM

## 2022-03-22 PROCEDURE — 36000706: Performed by: STUDENT IN AN ORGANIZED HEALTH CARE EDUCATION/TRAINING PROGRAM

## 2022-03-22 PROCEDURE — 36000707: Performed by: STUDENT IN AN ORGANIZED HEALTH CARE EDUCATION/TRAINING PROGRAM

## 2022-03-22 PROCEDURE — 88305 TISSUE EXAM BY PATHOLOGIST: ICD-10-PCS | Mod: 26,,, | Performed by: PATHOLOGY

## 2022-03-22 PROCEDURE — 88305 TISSUE EXAM BY PATHOLOGIST: CPT | Performed by: PATHOLOGY

## 2022-03-22 PROCEDURE — 37000009 HC ANESTHESIA EA ADD 15 MINS: Performed by: STUDENT IN AN ORGANIZED HEALTH CARE EDUCATION/TRAINING PROGRAM

## 2022-03-22 PROCEDURE — 25000003 PHARM REV CODE 250: Performed by: STUDENT IN AN ORGANIZED HEALTH CARE EDUCATION/TRAINING PROGRAM

## 2022-03-22 PROCEDURE — 25000003 PHARM REV CODE 250: Performed by: NURSE ANESTHETIST, CERTIFIED REGISTERED

## 2022-03-22 PROCEDURE — D9220A PRA ANESTHESIA: ICD-10-PCS | Mod: ,,, | Performed by: ANESTHESIOLOGY

## 2022-03-22 PROCEDURE — D9220A PRA ANESTHESIA: Mod: ,,, | Performed by: ANESTHESIOLOGY

## 2022-03-22 PROCEDURE — 52649 PROSTATE LASER ENUCLEATION: CPT | Mod: ,,, | Performed by: STUDENT IN AN ORGANIZED HEALTH CARE EDUCATION/TRAINING PROGRAM

## 2022-03-22 PROCEDURE — 63600175 PHARM REV CODE 636 W HCPCS: Performed by: STUDENT IN AN ORGANIZED HEALTH CARE EDUCATION/TRAINING PROGRAM

## 2022-03-22 PROCEDURE — 52649 PROSTATE LASER ENUCLEATION: CPT | Mod: SPCCPT | Performed by: STUDENT IN AN ORGANIZED HEALTH CARE EDUCATION/TRAINING PROGRAM

## 2022-03-22 PROCEDURE — C1769 GUIDE WIRE: HCPCS | Performed by: STUDENT IN AN ORGANIZED HEALTH CARE EDUCATION/TRAINING PROGRAM

## 2022-03-22 RX ORDER — ATROPA BELLADONNA AND OPIUM 16.2; 3 MG/1; MG/1
30 SUPPOSITORY RECTAL
Status: DISCONTINUED | OUTPATIENT
Start: 2022-03-22 | End: 2022-03-22 | Stop reason: HOSPADM

## 2022-03-22 RX ORDER — ACETAMINOPHEN 500 MG
1000 TABLET ORAL
Status: COMPLETED | OUTPATIENT
Start: 2022-03-22 | End: 2022-03-22

## 2022-03-22 RX ORDER — OXYBUTYNIN CHLORIDE 5 MG/1
5 TABLET, EXTENDED RELEASE ORAL DAILY PRN
Qty: 15 TABLET | Refills: 0 | Status: SHIPPED | OUTPATIENT
Start: 2022-03-22 | End: 2022-12-13

## 2022-03-22 RX ORDER — PROPOFOL 10 MG/ML
VIAL (ML) INTRAVENOUS
Status: DISCONTINUED | OUTPATIENT
Start: 2022-03-22 | End: 2022-03-22

## 2022-03-22 RX ORDER — LIDOCAINE HYDROCHLORIDE 10 MG/ML
1 INJECTION, SOLUTION EPIDURAL; INFILTRATION; INTRACAUDAL; PERINEURAL ONCE
Status: DISCONTINUED | OUTPATIENT
Start: 2022-03-22 | End: 2022-03-22 | Stop reason: HOSPADM

## 2022-03-22 RX ORDER — PHENAZOPYRIDINE HYDROCHLORIDE 100 MG/1
100 TABLET, FILM COATED ORAL
Status: DISCONTINUED | OUTPATIENT
Start: 2022-03-22 | End: 2022-03-22 | Stop reason: HOSPADM

## 2022-03-22 RX ORDER — DEXAMETHASONE SODIUM PHOSPHATE 4 MG/ML
INJECTION, SOLUTION INTRA-ARTICULAR; INTRALESIONAL; INTRAMUSCULAR; INTRAVENOUS; SOFT TISSUE
Status: DISCONTINUED | OUTPATIENT
Start: 2022-03-22 | End: 2022-03-22

## 2022-03-22 RX ORDER — OXYBUTYNIN CHLORIDE 5 MG/1
10 TABLET, EXTENDED RELEASE ORAL DAILY
Status: DISCONTINUED | OUTPATIENT
Start: 2022-03-22 | End: 2022-03-22 | Stop reason: HOSPADM

## 2022-03-22 RX ORDER — PHENYLEPHRINE HYDROCHLORIDE 10 MG/ML
INJECTION INTRAVENOUS
Status: DISCONTINUED | OUTPATIENT
Start: 2022-03-22 | End: 2022-03-22

## 2022-03-22 RX ORDER — SODIUM CHLORIDE, SODIUM LACTATE, POTASSIUM CHLORIDE, CALCIUM CHLORIDE 600; 310; 30; 20 MG/100ML; MG/100ML; MG/100ML; MG/100ML
INJECTION, SOLUTION INTRAVENOUS CONTINUOUS
Status: DISCONTINUED | OUTPATIENT
Start: 2022-03-22 | End: 2022-03-22 | Stop reason: HOSPADM

## 2022-03-22 RX ORDER — FENTANYL CITRATE 50 UG/ML
INJECTION, SOLUTION INTRAMUSCULAR; INTRAVENOUS
Status: DISCONTINUED | OUTPATIENT
Start: 2022-03-22 | End: 2022-03-22

## 2022-03-22 RX ORDER — ROCURONIUM BROMIDE 10 MG/ML
INJECTION, SOLUTION INTRAVENOUS
Status: DISCONTINUED | OUTPATIENT
Start: 2022-03-22 | End: 2022-03-22

## 2022-03-22 RX ORDER — MIDAZOLAM HYDROCHLORIDE 1 MG/ML
INJECTION, SOLUTION INTRAMUSCULAR; INTRAVENOUS
Status: DISCONTINUED | OUTPATIENT
Start: 2022-03-22 | End: 2022-03-22

## 2022-03-22 RX ORDER — ATROPA BELLADONNA AND OPIUM 16.2; 3 MG/1; MG/1
SUPPOSITORY RECTAL
Status: DISCONTINUED | OUTPATIENT
Start: 2022-03-22 | End: 2022-03-22 | Stop reason: HOSPADM

## 2022-03-22 RX ORDER — HYDROMORPHONE HYDROCHLORIDE 2 MG/ML
0.2 INJECTION, SOLUTION INTRAMUSCULAR; INTRAVENOUS; SUBCUTANEOUS EVERY 5 MIN PRN
Status: DISCONTINUED | OUTPATIENT
Start: 2022-03-22 | End: 2022-03-22

## 2022-03-22 RX ORDER — ONDANSETRON 2 MG/ML
INJECTION INTRAMUSCULAR; INTRAVENOUS
Status: DISCONTINUED | OUTPATIENT
Start: 2022-03-22 | End: 2022-03-22

## 2022-03-22 RX ORDER — POLYETHYLENE GLYCOL 3350 17 G/17G
17 POWDER, FOR SOLUTION ORAL DAILY
Qty: 7 EACH | Refills: 0 | Status: SHIPPED | OUTPATIENT
Start: 2022-03-22 | End: 2022-03-29

## 2022-03-22 RX ORDER — VECURONIUM BROMIDE FOR INJECTION 1 MG/ML
INJECTION, POWDER, LYOPHILIZED, FOR SOLUTION INTRAVENOUS
Status: DISCONTINUED | OUTPATIENT
Start: 2022-03-22 | End: 2022-03-22

## 2022-03-22 RX ORDER — COLCHICINE 0.6 MG/1
0.6 TABLET ORAL DAILY
COMMUNITY

## 2022-03-22 RX ORDER — LIDOCAINE HYDROCHLORIDE 20 MG/ML
INJECTION INTRAVENOUS
Status: DISCONTINUED | OUTPATIENT
Start: 2022-03-22 | End: 2022-03-22

## 2022-03-22 RX ORDER — SODIUM CHLORIDE 0.9 % (FLUSH) 0.9 %
10 SYRINGE (ML) INJECTION
Status: DISCONTINUED | OUTPATIENT
Start: 2022-03-22 | End: 2022-03-22 | Stop reason: HOSPADM

## 2022-03-22 RX ADMIN — VECURONIUM BROMIDE FOR INJECTION 2 MG: 1 INJECTION, POWDER, LYOPHILIZED, FOR SOLUTION INTRAVENOUS at 09:03

## 2022-03-22 RX ADMIN — VECURONIUM BROMIDE FOR INJECTION 2 MG: 1 INJECTION, POWDER, LYOPHILIZED, FOR SOLUTION INTRAVENOUS at 08:03

## 2022-03-22 RX ADMIN — PHENAZOPYRIDINE HYDROCHLORIDE 100 MG: 100 TABLET ORAL at 01:03

## 2022-03-22 RX ADMIN — CEFTRIAXONE 2 G: 2 INJECTION, SOLUTION INTRAVENOUS at 07:03

## 2022-03-22 RX ADMIN — PROPOFOL 170 MG: 10 INJECTION, EMULSION INTRAVENOUS at 07:03

## 2022-03-22 RX ADMIN — ROCURONIUM BROMIDE 50 MG: 10 INJECTION, SOLUTION INTRAVENOUS at 07:03

## 2022-03-22 RX ADMIN — SUGAMMADEX 200 MG: 100 INJECTION, SOLUTION INTRAVENOUS at 10:03

## 2022-03-22 RX ADMIN — ACETAMINOPHEN 1000 MG: 500 TABLET ORAL at 06:03

## 2022-03-22 RX ADMIN — PHENYLEPHRINE HYDROCHLORIDE 100 MCG: 10 INJECTION INTRAVENOUS at 08:03

## 2022-03-22 RX ADMIN — PHENYLEPHRINE HYDROCHLORIDE 100 MCG: 10 INJECTION INTRAVENOUS at 07:03

## 2022-03-22 RX ADMIN — VECURONIUM BROMIDE FOR INJECTION 2 MG: 1 INJECTION, POWDER, LYOPHILIZED, FOR SOLUTION INTRAVENOUS at 07:03

## 2022-03-22 RX ADMIN — SODIUM CHLORIDE, SODIUM LACTATE, POTASSIUM CHLORIDE, AND CALCIUM CHLORIDE: .6; .31; .03; .02 INJECTION, SOLUTION INTRAVENOUS at 07:03

## 2022-03-22 RX ADMIN — FENTANYL CITRATE 100 MCG: 50 INJECTION, SOLUTION INTRAMUSCULAR; INTRAVENOUS at 07:03

## 2022-03-22 RX ADMIN — ONDANSETRON 4 MG: 2 INJECTION, SOLUTION INTRAMUSCULAR; INTRAVENOUS at 09:03

## 2022-03-22 RX ADMIN — DEXAMETHASONE SODIUM PHOSPHATE 4 MG: 4 INJECTION, SOLUTION INTRAMUSCULAR; INTRAVENOUS at 07:03

## 2022-03-22 RX ADMIN — LIDOCAINE HYDROCHLORIDE 100 MG: 20 INJECTION, SOLUTION INTRAVENOUS at 07:03

## 2022-03-22 RX ADMIN — OXYBUTYNIN CHLORIDE 10 MG: 5 TABLET, EXTENDED RELEASE ORAL at 01:03

## 2022-03-22 RX ADMIN — MIDAZOLAM HYDROCHLORIDE 2 MG: 1 INJECTION, SOLUTION INTRAMUSCULAR; INTRAVENOUS at 07:03

## 2022-03-22 NOTE — PROGRESS NOTES
updated pt and partner re intraop findings and POC    vot in AM    urine clear, urgency noted  abdomen soft

## 2022-03-22 NOTE — TRANSFER OF CARE
Anesthesia Transfer of Care Note    Patient: Ephraim Coyle    Procedure(s) Performed: Procedure(s) (LRB):  ENUCLEATION, PROSTATE, USING LASER (N/A)    Patient location: PACU    Anesthesia Type: general    Transport from OR: Transported from OR on room air with adequate spontaneous ventilation    Post pain: adequate analgesia    Post assessment: no apparent anesthetic complications and tolerated procedure well    Post vital signs: stable    Level of consciousness: sedated and responds to stimulation    Nausea/Vomiting: no nausea/vomiting    Complications: none    Transfer of care protocol was followed      Last vitals:   Visit Vitals  BP (!) 155/82 (BP Location: Left arm, Patient Position: Lying)   Pulse 72   Temp 36.5 °C (97.7 °F) (Oral)   Resp 16   SpO2 98%

## 2022-03-22 NOTE — INTERVAL H&P NOTE
The patient has been examined and the H&P has been reviewed:    I concur with the findings and no changes have occurred since H&P was written.    Surgery risks, benefits and alternative options discussed and understood by patient/family.  Denies fevers, chills, chest pain, shortness of breath, nausea or vomiting        There are no hospital problems to display for this patient.

## 2022-03-22 NOTE — OP NOTE
DATE OF PROCEDURE:  3/22/2022  PREOPERATIVE DIAGNOSIS:  BPH with obstruction. Lower urinary tract symptoms     POSTOPERATIVE DIAGNOSIS:  as above     PROCEDURE PERFORMED: Holmium laser enucleation of the prostate     PRIMARY SURGEON:  Marine Kenny M.D.     ANESTHESIA:  General.     ESTIMATED BLOOD LOSS: 20cc     DRAINS:  A 20-Swedish Alvarado catheter.     SPECIMENS REMOVED:  Prostate specimen     COMPLICATIONS:  None, immediate     INDICATIONS: 71 year old man man with lower urinary tract symptoms desiring definitive management of bladder outlet obstruction. PSA 1.4, prostate estimated to be about 81cc via transabdominal ultrasound. Pre operative urodynamics evaluation with evidence of bladder outlet obstruction, incomplete bladder emptying, decreased urinary flow, detrusor overactivity. After informed consent reviewed patient elected to proceed with addressing bladder outlet obstruction.     Operative Findings:   - trilobar hypertrophy of the bladder with high bladder neck  - Wide open prostate fossa at cessation of case without injury to ureteral orifices     PROCEDURE DETAILS:   Mr. Coyle was taken to the Operating Room where he was positively identified by armband.  He was placed supine on the operating room table.  Following induction of adequate general anesthesia, he was placed in the dorsal lithotomy position and his external genitalia were prepped and draped in usual sterile fashion. A preoperative timeout was performed as well as confirmation of preoperative   Antibiotics, ceftriaxone. A 26-Swedish resectoscope sheath was then passed per urethra into the bladder using the visual obturator after dilating urethral meatus from 24 fr to 32 fr with metal sounds. The obturator was withdrawn and the  resectoscope with laser bridge was then inserted.  The bladder was inspected. Both ureteral orifices were  in their orthotopic position away from the bladder neck, images were taken.   The prostate was  inspected, hypertrophy of the lateral lobes of the prostate was noted. On settings of 2 J 10 Hz I used a 550 micron holmium laser on  Az settings to dalia the mucosa proximal to the urethral sphincter. Both ureteral orifices were  in their orthotopic position away from the bladder neck.   The prostate was inspected, trilobar hypertrophy of the he prostate was noted with median lobe prominence into the urinary bladder.  Az settings used for hemostasis during enucleation. I then began my resection using an en bloc technique, I started by creating a horseshoe incision at the 6 o clock position just above the verumontanum to ensure all the apical tissue was released .  I carried this until I was able to generate a plane between the prostate adenoma and prostate capsule posteriorly. I carried this circumferentially along both lateral lobes of the prostate. I connected the lateral incisions with with anterior release of the prostate adenoma from the prostate capsule until the bladder mucosa was reached anteriorly. The mucosal strip was  avoiding tension on the urethral sphincter. Once the prostate was freed circumferentially, I turned my attention to the prostatic fossa, I used laser energy to coagulate any active bleeders. I used the laser to vaporize any residual adenomatous fragments.  The bladder was inspected systematically, no injury to the bladder was noted.  I then switched to a nephroscope to begin the morcellation. A [employer] Visionna morcellator was used to morcellate the prostate adenoma while the urinary bladder remained distended. At the end of morcellation no residual pieces of prostate tissue were seen. The irrigant from the bladder remained clear, no injury to either ureteral orifice was seen.  Hemostasis was satisfactory. The verumontanum and urinary sphincter remained intact without injury.   The scope was then withdrawn. A 20-Cymraes Alvarado catheter was inserted without difficulty into the bladder  with 30 mL was placed into the balloon.  The catheter was irrigated three times and the irrigant   remained clear without debris. The catheter was left to gravity drainage. His anesthesia was reversed.  He was then taken to the Recovery Room in stable condition.

## 2022-03-22 NOTE — BRIEF OP NOTE
[facility] - Surgery  Brief Operative Note    Surgery Date: 3/22/2022     Surgeon(s) and Role:     * Marine Kenny MD - Primary    Assisting Surgeon: None    Pre-op Diagnosis:  BPH associated with nocturia [N40.1, R35.1]    Post-op Diagnosis:  Post-Op Diagnosis Codes:     * BPH associated with nocturia [N40.1, R35.1]    Procedure(s) (LRB):  ENUCLEATION, PROSTATE, USING LASER (N/A)    Anesthesia: General    Operative Findings:   - trilobar hypertrophy of the bladder with high bladder neck  - Wide open prostate fossa at cessation of case without injury to ureteral orifices    Estimated Blood Loss: 20 mL         Specimens:   Specimen (24h ago, onward)             Start     Ordered    03/22/22 0950  Specimen to Pathology, Surgery Urology  Once        Comments: Pre-op Diagnosis: BPH associated with nocturia [N40.1, R35.1]    Procedure(s):  ENUCLEATION, PROSTATE, USING LASER     Number of specimens: 1    Name of specimens:  Prostate tissue   References:    Click here for ordering Quick Tip   Question Answer Comment   Procedure Type: Urology    Specimen Class: Routine/Screening    Release to patient Immediate        03/22/22 0959                  Discharge Note    OUTCOME: Patient tolerated treatment/procedure well without complication and is now ready for discharge.    DISPOSITION: Home or Self Care    FINAL DIAGNOSIS:  BPH associated with nocturia    FOLLOWUP: In clinic    DISCHARGE INSTRUCTIONS:    Discharge Procedure Orders   Diet general     No dressing needed     Call MD for:  temperature >100.4     Call MD for:  persistent nausea and vomiting     Call MD for:  severe uncontrolled pain     Call MD for:  difficulty breathing, headache or visual disturbances     Activity as tolerated

## 2022-03-22 NOTE — ANESTHESIA POSTPROCEDURE EVALUATION
Anesthesia Post Evaluation    Patient: Ephraim Coyle    Procedure(s) Performed: Procedure(s) (LRB):  ENUCLEATION, PROSTATE, USING LASER (N/A)    Final Anesthesia Type: general      Patient location during evaluation: PACU  Patient participation: Yes- Able to Participate  Level of consciousness: awake and alert and oriented  Post-procedure vital signs: reviewed and stable  Pain management: adequate  Airway patency: patent    PONV status at discharge: No PONV  Anesthetic complications: no      Cardiovascular status: hemodynamically stable and blood pressure returned to baseline  Respiratory status: spontaneous ventilation, room air and unassisted  Hydration status: euvolemic  Follow-up not needed.          Vitals Value Taken Time   /90 03/22/22 1333   Temp 36.5 °C (97.7 °F) 03/22/22 1333   Pulse 70 03/22/22 1333   Resp 18 03/22/22 1333   SpO2 96 % 03/22/22 1333         Event Time   Out of Recovery 12:32:00         Pain/Lawrence Score: Pain Rating Prior to Med Admin: 0 (3/22/2022 10:10 AM)  Lawrence Score: 10 (3/22/2022 12:33 PM)

## 2022-03-22 NOTE — ANESTHESIA PROCEDURE NOTES
Intubation    Date/Time: 3/22/2022 7:24 AM  Performed by: Finesse Rae CRNA  Authorized by: Martina Manuel MD     Intubation:     Induction:  Intravenous    Intubated:  Postinduction    Mask Ventilation:  Easy mask    Attempts:  1    Attempted By:  CRNA    Method of Intubation:  Video laryngoscopy    Blade:  Koehler 3    Laryngeal View Grade: Grade I - full view of cords      Difficult Airway Encountered?: No      Complications:  None    Airway Device:  Oral endotracheal tube    Airway Device Size:  7.5    Style/Cuff Inflation:  Cuffed (inflated to minimal occlusive pressure)    Tube secured:  21    Secured at:  The lips    Placement Verified By:  Capnometry    Complicating Factors:  None    Findings Post-Intubation:  BS equal bilateral and atraumatic/condition of teeth unchanged

## 2022-03-23 ENCOUNTER — OFFICE VISIT (OUTPATIENT)
Dept: UROLOGY | Facility: CLINIC | Age: 72
End: 2022-03-23
Payer: MEDICARE

## 2022-03-23 VITALS — WEIGHT: 231 LBS | HEIGHT: 71 IN | BODY MASS INDEX: 32.34 KG/M2

## 2022-03-23 DIAGNOSIS — N40.1 BPH ASSOCIATED WITH NOCTURIA: Primary | ICD-10-CM

## 2022-03-23 DIAGNOSIS — R35.1 BPH ASSOCIATED WITH NOCTURIA: Primary | ICD-10-CM

## 2022-03-23 PROCEDURE — 1126F PR PAIN SEVERITY QUANTIFIED, NO PAIN PRESENT: ICD-10-PCS | Mod: CPTII,S$GLB,, | Performed by: NURSE PRACTITIONER

## 2022-03-23 PROCEDURE — 3288F PR FALLS RISK ASSESSMENT DOCUMENTED: ICD-10-PCS | Mod: CPTII,S$GLB,, | Performed by: NURSE PRACTITIONER

## 2022-03-23 PROCEDURE — 1126F AMNT PAIN NOTED NONE PRSNT: CPT | Mod: CPTII,S$GLB,, | Performed by: NURSE PRACTITIONER

## 2022-03-23 PROCEDURE — 3288F FALL RISK ASSESSMENT DOCD: CPT | Mod: CPTII,S$GLB,, | Performed by: NURSE PRACTITIONER

## 2022-03-23 PROCEDURE — 3008F BODY MASS INDEX DOCD: CPT | Mod: CPTII,S$GLB,, | Performed by: NURSE PRACTITIONER

## 2022-03-23 PROCEDURE — 99024 POSTOP FOLLOW-UP VISIT: CPT | Mod: S$GLB,,, | Performed by: NURSE PRACTITIONER

## 2022-03-23 PROCEDURE — 1159F PR MEDICATION LIST DOCUMENTED IN MEDICAL RECORD: ICD-10-PCS | Mod: CPTII,S$GLB,, | Performed by: NURSE PRACTITIONER

## 2022-03-23 PROCEDURE — 3008F PR BODY MASS INDEX (BMI) DOCUMENTED: ICD-10-PCS | Mod: CPTII,S$GLB,, | Performed by: NURSE PRACTITIONER

## 2022-03-23 PROCEDURE — 1101F PT FALLS ASSESS-DOCD LE1/YR: CPT | Mod: CPTII,S$GLB,, | Performed by: NURSE PRACTITIONER

## 2022-03-23 PROCEDURE — 99024 PR POST-OP FOLLOW-UP VISIT: ICD-10-PCS | Mod: S$GLB,,, | Performed by: NURSE PRACTITIONER

## 2022-03-23 PROCEDURE — 1160F RVW MEDS BY RX/DR IN RCRD: CPT | Mod: CPTII,S$GLB,, | Performed by: NURSE PRACTITIONER

## 2022-03-23 PROCEDURE — 4010F PR ACE/ARB THEARPY RXD/TAKEN: ICD-10-PCS | Mod: CPTII,S$GLB,, | Performed by: NURSE PRACTITIONER

## 2022-03-23 PROCEDURE — 1101F PR PT FALLS ASSESS DOC 0-1 FALLS W/OUT INJ PAST YR: ICD-10-PCS | Mod: CPTII,S$GLB,, | Performed by: NURSE PRACTITIONER

## 2022-03-23 PROCEDURE — 4010F ACE/ARB THERAPY RXD/TAKEN: CPT | Mod: CPTII,S$GLB,, | Performed by: NURSE PRACTITIONER

## 2022-03-23 PROCEDURE — 1159F MED LIST DOCD IN RCRD: CPT | Mod: CPTII,S$GLB,, | Performed by: NURSE PRACTITIONER

## 2022-03-23 PROCEDURE — 99999 PR PBB SHADOW E&M-EST. PATIENT-LVL V: CPT | Mod: PBBFAC,,, | Performed by: NURSE PRACTITIONER

## 2022-03-23 PROCEDURE — 99999 PR PBB SHADOW E&M-EST. PATIENT-LVL V: ICD-10-PCS | Mod: PBBFAC,,, | Performed by: NURSE PRACTITIONER

## 2022-03-23 PROCEDURE — 1160F PR REVIEW ALL MEDS BY PRESCRIBER/CLIN PHARMACIST DOCUMENTED: ICD-10-PCS | Mod: CPTII,S$GLB,, | Performed by: NURSE PRACTITIONER

## 2022-03-23 NOTE — PROGRESS NOTES
Subjective:       Patient ID: Ephraim Coyle is a 71 y.o. male who is an established patient of Dr Kenny though new to me was last seen in this office 3/2/2022    Chief Complaint:   Chief Complaint   Patient presents with    Follow-up       Post-Operative Follow-up  Patient here for post-op follow-up. Patient is 1 day status post HoLEP with Dr Kenny. The patient reports no problems with eating, bowel movements, or saldana catheter.  The patient is not having any pain.  He is here today for a voiding trial. Denies gross hematuria or fever      ACTIVE MEDICAL ISSUES:  Patient Active Problem List   Diagnosis    Voiding dysfunction    BPH associated with nocturia       ALLERGIES AND MEDICATIONS: updated and reviewed.  Review of patient's allergies indicates:  No Known Allergies  Current Outpatient Medications   Medication Sig    acetaminophen (TYLENOL) 500 MG tablet Take 500 mg by mouth 3 (three) times daily.    allopurinoL (ZYLOPRIM) 100 MG tablet Take 100 mg by mouth once daily.    AMLODIPINE BESYLATE, BULK, MISC 10 mg by Misc.(Non-Drug; Combo Route) route once. 1 tablet by mouth once daily    atorvastatin (LIPITOR) 80 MG tablet Take 80 mg by mouth once daily.    colchicine (COLCRYS) 0.6 mg tablet Take 0.6 mg by mouth once daily.    diclofenac sodium (VOLTAREN) 1 % Gel Apply 2 g topically once daily.    famotidine (PEPCID) 20 MG tablet Take 20 mg by mouth 2 (two) times daily.    losartan (COZAAR) 100 MG tablet Take 100 mg by mouth once daily. Take one half tablet by mouth    oxybutynin (DITROPAN-XL) 5 MG TR24 Take 1 tablet (5 mg total) by mouth daily as needed (BLADDER SPASMS;URINARY URGENCY).    polyethylene glycol (GLYCOLAX) 17 gram PwPk Take 17 g by mouth once daily. for 7 days    sodium bicarbonate 650 MG tablet Take 650 mg by mouth every 24 hours as needed for Heartburn.    UNKNOWN TO PATIENT Daily. Pt thinks may be Colchicine for gout--just started it.     No current facility-administered  "medications for this visit.       Review of Systems   Constitutional: Negative for activity change, chills, fatigue, fever and unexpected weight change.   Eyes: Negative for discharge, redness and visual disturbance.   Respiratory: Negative for cough, shortness of breath and wheezing.    Cardiovascular: Negative for chest pain and leg swelling.   Gastrointestinal: Negative for abdominal distention, abdominal pain, constipation, diarrhea, nausea and vomiting.   Genitourinary: Negative for dysuria, flank pain, frequency, hematuria, penile discharge, penile pain, penile swelling, scrotal swelling, testicular pain and urgency.   Musculoskeletal: Negative for arthralgias, joint swelling and myalgias.   Skin: Negative for color change and rash.   Neurological: Negative for dizziness and light-headedness.   Psychiatric/Behavioral: Negative for behavioral problems and confusion. The patient is not nervous/anxious.        Objective:      Vitals:    03/23/22 0847   Weight: 104.8 kg (231 lb)   Height: 5' 11" (1.803 m)     Physical Exam  Constitutional:       Appearance: He is well-developed.   HENT:      Head: Normocephalic and atraumatic.      Nose: Nose normal.   Eyes:      General:         Right eye: No discharge.         Left eye: No discharge.      Conjunctiva/sclera: Conjunctivae normal.   Neck:      Thyroid: No thyromegaly.      Trachea: No tracheal deviation.   Cardiovascular:      Rate and Rhythm: Normal rate and regular rhythm.   Pulmonary:      Effort: Pulmonary effort is normal. No respiratory distress.      Breath sounds: No wheezing.   Abdominal:      General: There is no distension.      Palpations: Abdomen is soft.      Tenderness: There is no abdominal tenderness.      Hernia: No hernia is present.   Genitourinary:     Comments: Alvarado draining yellow urine  Musculoskeletal:         General: Normal range of motion.      Cervical back: Normal range of motion and neck supple.   Skin:     General: Skin is warm and " dry.      Findings: No erythema or rash.   Neurological:      Mental Status: He is alert and oriented to person, place, and time.   Psychiatric:         Behavior: Behavior normal.         Judgment: Judgment normal.         Urine dipstick shows not done.     Voiding Trial: 180cc instilled, 200cc voided    Assessment:       1. BPH associated with nocturia          Plan:       1. BPH associated with nocturia  -s/p HoLEP 3/23/22 with Dr Kenny  - Voiding Trial--successful  -Adequate hydration  -Avoid/treat constipation            Follow up in about 4 weeks (around 4/20/2022) for Follow up PVR.

## 2022-03-24 LAB
FINAL PATHOLOGIC DIAGNOSIS: NORMAL
GROSS: NORMAL
Lab: NORMAL

## 2022-04-27 ENCOUNTER — OFFICE VISIT (OUTPATIENT)
Dept: UROLOGY | Facility: CLINIC | Age: 72
End: 2022-04-27
Payer: OTHER GOVERNMENT

## 2022-04-27 VITALS — HEIGHT: 71 IN | WEIGHT: 235.56 LBS | BODY MASS INDEX: 32.98 KG/M2

## 2022-04-27 DIAGNOSIS — N40.1 BPH ASSOCIATED WITH NOCTURIA: Primary | ICD-10-CM

## 2022-04-27 DIAGNOSIS — R35.1 BPH ASSOCIATED WITH NOCTURIA: Primary | ICD-10-CM

## 2022-04-27 PROCEDURE — 99999 PR PBB SHADOW E&M-EST. PATIENT-LVL IV: CPT | Mod: PBBFAC,,, | Performed by: STUDENT IN AN ORGANIZED HEALTH CARE EDUCATION/TRAINING PROGRAM

## 2022-04-27 PROCEDURE — 99024 PR POST-OP FOLLOW-UP VISIT: ICD-10-PCS | Mod: ,,, | Performed by: STUDENT IN AN ORGANIZED HEALTH CARE EDUCATION/TRAINING PROGRAM

## 2022-04-27 PROCEDURE — 99214 OFFICE O/P EST MOD 30 MIN: CPT | Mod: PBBFAC | Performed by: STUDENT IN AN ORGANIZED HEALTH CARE EDUCATION/TRAINING PROGRAM

## 2022-04-27 PROCEDURE — 99999 PR PBB SHADOW E&M-EST. PATIENT-LVL IV: ICD-10-PCS | Mod: PBBFAC,,, | Performed by: STUDENT IN AN ORGANIZED HEALTH CARE EDUCATION/TRAINING PROGRAM

## 2022-04-27 PROCEDURE — 99024 POSTOP FOLLOW-UP VISIT: CPT | Mod: ,,, | Performed by: STUDENT IN AN ORGANIZED HEALTH CARE EDUCATION/TRAINING PROGRAM

## 2022-04-27 NOTE — PROGRESS NOTES
S/p HOLEP for BPH w/ LUTS   Path with benign tissue  Presents for 1 month follow up  Patient denies incontinence, pain with ejaculation  Patient has hx of baseline ED, on [facility]. Notes difficulty maintaining erection  Prior to ejaculation. Patient denies hematuria.   IPSS 5 ( [id number]), pleased  Pre op PSA 1.4  Urinary urgency, frequency have largely resolved  Compliant with CPAP for hx of RAQUEL    Exam  No acute distress  No abdominal distention      Clinically stable  PVR 33cc  POCT UA w/o signs of infection. Possible blood- anticipated this close to recent procedure  Follow up in 2 months with PSA, baseline and SEBASTIAN to eval for hydronephrosis that was noted on pre operative imaging

## 2022-04-28 ENCOUNTER — HOSPITAL ENCOUNTER (OUTPATIENT)
Dept: RADIOLOGY | Facility: HOSPITAL | Age: 72
Discharge: HOME | End: 2022-04-28
Attending: STUDENT IN AN ORGANIZED HEALTH CARE EDUCATION/TRAINING PROGRAM
Payer: MEDICARE

## 2022-04-28 DIAGNOSIS — R35.1 BPH ASSOCIATED WITH NOCTURIA: ICD-10-CM

## 2022-04-28 DIAGNOSIS — N40.1 BPH ASSOCIATED WITH NOCTURIA: ICD-10-CM

## 2022-04-28 PROCEDURE — 76770 US EXAM ABDO BACK WALL COMP: CPT | Mod: 26,,, | Performed by: RADIOLOGY

## 2022-04-28 PROCEDURE — 76770 US EXAM ABDO BACK WALL COMP: CPT | Mod: TC

## 2022-04-28 PROCEDURE — 76770 US RETROPERITONEAL COMPLETE: ICD-10-PCS | Mod: 26,,, | Performed by: RADIOLOGY

## 2022-04-29 ENCOUNTER — TELEPHONE (OUTPATIENT)
Dept: FAMILY MEDICINE | Facility: CLINIC | Age: 72
End: 2022-04-29
Payer: OTHER GOVERNMENT

## 2022-04-29 ENCOUNTER — TELEPHONE (OUTPATIENT)
Dept: UROLOGY | Facility: CLINIC | Age: 72
End: 2022-04-29
Payer: OTHER GOVERNMENT

## 2022-04-29 DIAGNOSIS — N22 CALCULUS OF URINARY TRACT IN DISEASES CLASSIFIED ELSEWHERE: ICD-10-CM

## 2022-04-29 NOTE — TELEPHONE ENCOUNTER
LVM to have pt return call to go over results    ----- Message from Marine Kenny MD sent at 4/29/2022  8:22 AM CDT -----  Please alert patient that he has stones in kidneys as well as cysts. I recommend a CT scan to make sure there is no blockage of his right kidney. I have placed the order, this can be completed next available.

## 2022-04-29 NOTE — PROGRESS NOTES
Please alert patient that he has stones in kidneys as well as cysts. I recommend a CT scan to make sure there is no blockage of his right kidney. I have placed the order, this can be completed next available.

## 2022-04-29 NOTE — TELEPHONE ENCOUNTER
----- Message from Marine Kenny MD sent at 4/29/2022  8:22 AM CDT -----  Please alert patient that he has stones in kidneys as well as cysts. I recommend a CT scan to make sure there is no blockage of his right kidney. I have placed the order, this can be completed next available.

## 2022-05-03 ENCOUNTER — TELEPHONE (OUTPATIENT)
Dept: FAMILY MEDICINE | Facility: CLINIC | Age: 72
End: 2022-05-03
Payer: OTHER GOVERNMENT

## 2022-05-03 NOTE — TELEPHONE ENCOUNTER
Spoke with patient verbalized good understanding on results. Patient is also asking where he can get a silicone penal ring, patient states that he has tried [facility] and [facility] and has not had any luck.

## 2022-05-03 NOTE — TELEPHONE ENCOUNTER
----- Message from Donna Alva sent at 5/3/2022  4:45 PM CDT -----  Type:  Patient Returning Call    Who Called: pt   Who Left Message for Patient: pt   Does the patient know what this is regarding?:pt need a call   Would the patient rather a call back or a response via [facility]?  call  Best Call Back Number: [phone]  Additional Information:  call

## 2022-06-07 ENCOUNTER — HOSPITAL ENCOUNTER (OUTPATIENT)
Dept: RADIOLOGY | Facility: HOSPITAL | Age: 72
Discharge: HOME | End: 2022-06-07
Attending: STUDENT IN AN ORGANIZED HEALTH CARE EDUCATION/TRAINING PROGRAM
Payer: MEDICARE

## 2022-06-07 DIAGNOSIS — N22 CALCULUS OF URINARY TRACT IN DISEASES CLASSIFIED ELSEWHERE: ICD-10-CM

## 2022-06-07 PROCEDURE — 74176 CT RENAL STONE STUDY ABD PELVIS WO: ICD-10-PCS | Mod: 26,,, | Performed by: RADIOLOGY

## 2022-06-07 PROCEDURE — 74176 CT ABD & PELVIS W/O CONTRAST: CPT | Mod: TC

## 2022-06-07 PROCEDURE — 74176 CT ABD & PELVIS W/O CONTRAST: CPT | Mod: 26,,, | Performed by: RADIOLOGY

## 2022-06-15 ENCOUNTER — LAB VISIT (OUTPATIENT)
Dept: LAB | Facility: HOSPITAL | Age: 72
End: 2022-06-15
Attending: STUDENT IN AN ORGANIZED HEALTH CARE EDUCATION/TRAINING PROGRAM
Payer: MEDICARE

## 2022-06-15 DIAGNOSIS — R35.1 BPH ASSOCIATED WITH NOCTURIA: ICD-10-CM

## 2022-06-15 DIAGNOSIS — N40.1 BPH ASSOCIATED WITH NOCTURIA: ICD-10-CM

## 2022-06-15 LAB — COMPLEXED PSA SERPL-MCNC: 0.55 NG/ML (ref 0–4)

## 2022-06-15 PROCEDURE — 36415 COLL VENOUS BLD VENIPUNCTURE: CPT | Performed by: STUDENT IN AN ORGANIZED HEALTH CARE EDUCATION/TRAINING PROGRAM

## 2022-06-15 PROCEDURE — 84153 ASSAY OF PSA TOTAL: CPT | Performed by: STUDENT IN AN ORGANIZED HEALTH CARE EDUCATION/TRAINING PROGRAM

## 2022-10-24 ENCOUNTER — TELEPHONE (OUTPATIENT)
Dept: UROLOGY | Facility: CLINIC | Age: 72
End: 2022-10-24
Payer: OTHER GOVERNMENT

## 2022-10-24 NOTE — TELEPHONE ENCOUNTER
Pt notified of appt with Dr. Kenny 12/5/22 @ 9:15am        ----- Message from Susan Jessica sent at 10/24/2022 12:36 PM CDT -----  Type: Patient Call Back        Who called: self         What is the request in detail: Pt is asking for a nurse to give him a call back         Can the clinic reply by [facility]? No         Would the patient rather a call back or a response via [facility]? Yes         Best call back number: [phone] (home)                   Thank You

## 2022-11-30 ENCOUNTER — TELEPHONE (OUTPATIENT)
Dept: UROLOGY | Facility: CLINIC | Age: 72
End: 2022-11-30
Payer: OTHER GOVERNMENT

## 2022-12-13 ENCOUNTER — OFFICE VISIT (OUTPATIENT)
Dept: UROLOGY | Facility: CLINIC | Age: 72
End: 2022-12-13
Payer: OTHER GOVERNMENT

## 2022-12-13 VITALS
SYSTOLIC BLOOD PRESSURE: 139 MMHG | WEIGHT: 224.63 LBS | HEART RATE: 82 BPM | DIASTOLIC BLOOD PRESSURE: 81 MMHG | BODY MASS INDEX: 31.33 KG/M2

## 2022-12-13 DIAGNOSIS — N39.43 POST-VOID DRIBBLING: Primary | ICD-10-CM

## 2022-12-13 PROCEDURE — 99213 OFFICE O/P EST LOW 20 MIN: CPT | Mod: PBBFAC | Performed by: NURSE PRACTITIONER

## 2022-12-13 PROCEDURE — 99999 PR PBB SHADOW E&M-EST. PATIENT-LVL III: ICD-10-PCS | Mod: PBBFAC,,, | Performed by: NURSE PRACTITIONER

## 2022-12-13 PROCEDURE — 99999 PR PBB SHADOW E&M-EST. PATIENT-LVL III: CPT | Mod: PBBFAC,,, | Performed by: NURSE PRACTITIONER

## 2022-12-13 PROCEDURE — 99214 PR OFFICE/OUTPT VISIT, EST, LEVL IV, 30-39 MIN: ICD-10-PCS | Mod: S$PBB,,, | Performed by: NURSE PRACTITIONER

## 2022-12-13 PROCEDURE — 99214 OFFICE O/P EST MOD 30 MIN: CPT | Mod: S$PBB,,, | Performed by: NURSE PRACTITIONER

## 2022-12-13 RX ORDER — OXYBUTYNIN CHLORIDE 5 MG/1
5 TABLET, EXTENDED RELEASE ORAL DAILY
Qty: 30 TABLET | Refills: 11 | Status: SHIPPED | OUTPATIENT
Start: 2022-12-13 | End: 2023-12-13

## 2022-12-13 NOTE — PROGRESS NOTES
CHIEF COMPLAINT:    Mr. Coyle is a 72 y.o. male presenting for urinary dribbling.      PRESENTING ILLNESS:    Ephraim Coyle is a 72 y.o. male with a PMH of bph, holep 3/22/22 who presents for urinary dribbling.    Established patient of Dr. Kenny. S/P Holep procedure 3/22/22.  Has progressed well post operatively.  Presents today due to urinary dribbling post void. Unsure if leaking only happening post void.  He reports a good urinary stream and complete bladder emptying.        REVIEW OF SYSTEMS:    Review of Systems   Constitutional:  Negative for chills and fever.   Respiratory:  Negative for shortness of breath.    Cardiovascular:  Negative for chest pain.   Gastrointestinal:  Negative for constipation and diarrhea.   Genitourinary:  Negative for dysuria, flank pain, frequency, hematuria and urgency.        Dribbling   Neurological:  Negative for dizziness and weakness.     PATIENT HISTORY:    Past Medical History:   Diagnosis Date    Acid reflux     H/O colonoscopy     Hyperlipidemia     Hypertension     RAQUEL on CPAP     at times    Sleep apnea        No family history on file.    Allergies:  Patient has no known allergies.    Medications:    Current Outpatient Medications:     acetaminophen (TYLENOL) 500 MG tablet, Take 500 mg by mouth 3 (three) times daily., Disp: , Rfl:     allopurinoL (ZYLOPRIM) 100 MG tablet, Take 100 mg by mouth once daily., Disp: , Rfl:     AMLODIPINE BESYLATE, BULK, MISC, 10 mg by Misc.(Non-Drug; Combo Route) route once. 1 tablet by mouth once daily, Disp: , Rfl:     atorvastatin (LIPITOR) 80 MG tablet, Take 80 mg by mouth once daily., Disp: , Rfl:     colchicine (COLCRYS) 0.6 mg tablet, Take 0.6 mg by mouth once daily., Disp: , Rfl:     diclofenac sodium (VOLTAREN) 1 % Gel, Apply 2 g topically once daily., Disp: , Rfl:     famotidine (PEPCID) 20 MG tablet, Take 20 mg by mouth 2 (two) times daily., Disp: , Rfl:     losartan (COZAAR) 100 MG tablet, Take 100 mg by mouth once daily.  Take one half tablet by mouth, Disp: , Rfl:     oxybutynin (DITROPAN-XL) 5 MG TR24, Take 1 tablet (5 mg total) by mouth once daily., Disp: 30 tablet, Rfl: 11    sodium bicarbonate 650 MG tablet, Take 650 mg by mouth every 24 hours as needed for Heartburn., Disp: , Rfl:     UNKNOWN TO PATIENT, Daily. Pt thinks may be Colchicine for gout--just started it., Disp: , Rfl:     PHYSICAL EXAMINATION:    Physical Exam  Vitals and nursing note reviewed.   Constitutional:       Appearance: Normal appearance. He is well-developed.   HENT:      Head: Normocephalic and atraumatic.   Eyes:      Pupils: Pupils are equal, round, and reactive to light.   Pulmonary:      Effort: Pulmonary effort is normal.   Musculoskeletal:         General: Normal range of motion.      Cervical back: Normal range of motion.   Skin:     General: Skin is warm and dry.   Neurological:      Mental Status: He is alert and oriented to person, place, and time.   Psychiatric:         Behavior: Behavior normal.         LABS:    U/a performed in office today: yellow, ph 5, 1.020, otherwise unremarkable    Lab Results   Component Value Date    PSADIAG 0.55 06/15/2022    PSADIAG 1.4 03/15/2022       IMPRESSION:  Encounter Diagnoses   Name Primary?    Post-void dribbling Yes         PLAN:  Problem List Items Addressed This Visit    None  Visit Diagnoses       Post-void dribbling    -  Primary    Relevant Orders    Ambulatory referral/consult to Physical/Occupational Therapy    POCT URINE DIPSTICK WITHOUT MICROSCOPE            1. Post void dribbling   - referral to pelvic floor therapy   - Trial of oxybutynin. Side effects discussed including dry mouth, dry eyes, and constipation.     2.  Elevated PSA   - repeat PSA improved 0.55<-1.4  3. Rtc in 3 mths    Olive Merlos NP

## 2022-12-30 ENCOUNTER — CLINICAL SUPPORT (OUTPATIENT)
Dept: REHABILITATION | Facility: OTHER | Age: 72
End: 2022-12-30
Payer: OTHER GOVERNMENT

## 2022-12-30 DIAGNOSIS — N39.43 POST-VOID DRIBBLING: ICD-10-CM

## 2022-12-30 DIAGNOSIS — M62.89 PELVIC FLOOR WEAKNESS, MALE: ICD-10-CM

## 2022-12-30 DIAGNOSIS — R29.3 POSTURE ABNORMALITY: ICD-10-CM

## 2022-12-30 PROCEDURE — 97530 THERAPEUTIC ACTIVITIES: CPT

## 2022-12-30 PROCEDURE — 97162 PT EVAL MOD COMPLEX 30 MIN: CPT

## 2022-12-30 PROCEDURE — 97112 NEUROMUSCULAR REEDUCATION: CPT

## 2022-12-30 NOTE — PATIENT INSTRUCTIONS
Home Program 12/30/22:    Quick Flicks    Contract pelvic floor muscles as if stopping the flow of urine or lifting the base of the penis. These should be gentle contractions, do not squeeze too deeply. Relax completely after each contraction.     Repeat 10 times. Perform 6 sets/day.     *Can be done in standing in mirror - should see elevation of the penis        360 Breath - Inhale long, slow and deep. You should feel as if your lower ribs are expanding out to the sides. Your belly, back and sides should also gently expand and you may notice a relaxation in the pelvic floor.     Continue to breath like this for 10 breaths. Repeat 2 times/day. Do these 2 sets after doing your pelvic exercises. Can also be done before and during urination to see if allows for more complete emptying.    Prone press-ups/back extensions - do a set of 10 4-6x throughout the day, each set 2-3 hours apart. Stop if causing worsening pain. Continue until next session as long as no change or improvement in symptoms

## 2023-01-03 PROBLEM — M62.89 PELVIC FLOOR WEAKNESS, MALE: Status: ACTIVE | Noted: 2023-01-03

## 2023-01-03 PROBLEM — R29.3 POSTURE ABNORMALITY: Status: ACTIVE | Noted: 2023-01-03

## 2023-01-03 NOTE — PLAN OF CARE
[facility]  Pelvic Health Physical Therapy Initial Evaluation    Date: 12/30/2022   Name: Ephraim Coyle  Clinic Number: [id number]  Therapy Diagnosis:   Encounter Diagnosis   Name Primary?    Post-void dribbling      Physician: Olive Merlos NP    Physician Orders: PT Eval and Treat  Medical Diagnosis from Referral: post void dribbling  Evaluation Date: 12/30/2022  Authorization Period Expiration: 1 visit  Plan of Care Expiration: 3/28/23  Visit # / Visits authorized: 1/ 1    Time In: 1105  Time Out: 1200  Total Appointment Time (timed & untimed codes): 55 minutes    Precautions: universal    Subjective     Date of onset: has been going on for years, not necessarily getting worse but increased bother by getting up at night so [facility] who referred to [facility]    History of current condition - Ephraim reports: Has nocturia 3-4x/night and post-void dribbling. Dr. Kenny performed HoLEP procedure in March 2022 but did not see any relief. Dr. Kenny now on medical leave so he went to see Chelita at [facility] who prescribed oxybutynin and PFPT.     Surgical History: none, HoLEP 3/22  Sexually active currently?  Yes  Pain with sexual activity?  No  Able to attain erection?:  Yes  Able to maintain erection?:  No - has tried Viagra but gives side effects of dizziness so stopped taking it; can maintain to ejaculation 30% of time  Able to attain orgasm?  Yes     Bladder/Bowel History: urinary incontinence - post-void dribble - suspected: feels moist after he urinates  Frequency of urination:   Daytime: ~2 hours           Nighttime: 3-4x, difficulty falling back asleep  Difficulty initiating urine stream: No  Urine stream: strong  Complete emptying: Yes  Push to empty bladder: No  Bladder leakage: Yes  Frequency of incidents/Type of incontinence: post-void dribble  Amount leaked (urine): few drops  Urinary Urgency: No  Frequency of bowel movements: 1-2 times a  day  Difficulty initiating BM: No  Does Patient Feel Empty after BM? Yes    Pain:  Location: back - lumbar-LLE radiation  Aggravating Factors/Activities that cause symptoms: General movement , increased activity  Easing Factors: rest     Medical History: Ephraim  has a past medical history of Acid reflux, H/O colonoscopy, Hyperlipidemia, Hypertension, RAQUEL on CPAP, and Sleep apnea.     Surgical History: Ephraim Coyle  has a past surgical history that includes Cystoscopy with urodynamic testing (N/A, 10/8/2021) and Laser enucleation of prostate (N/A, 3/22/2022).    Medications: Ephraim has a current medication list which includes the following prescription(s): acetaminophen, allopurinol, amlodipine besylate, atorvastatin, colchicine, diclofenac sodium, famotidine, losartan, oxybutynin, sodium bicarbonate, and UNKNOWN TO PATIENT.    Allergies: Review of patient's allergies indicates:  No Known Allergies     Prior Therapy/Previous treatment included: HoLEP, recently started meds  Social History: lives with their spouse  Current exercise: water aerobics but only when its warm, walks, active lifestyle  Occupation: retired    Types of fluid intake: water 4-5 bottles and soda 1-2x/day  Diet: TBA     Abuse/Neglect: No     Pts goals: to improve quality of sleep  To improve sexual function    OBJECTIVE     See EMR under MEDIA for written consent provided 12/30/2022  Chaperone: Declined    ORTHO SCREEN  Posture in sitting: slouched  and sacral sitting  Posture in standing: cannot fully extend R knee, flexed trunk  Pelvic alignment: no sign of deviations noted in supine   SLS: not tested  Lumbar ROM: full and painfree    ABDOMINAL WALL ASSESSMENT  Palpation: WNL  Abdominal strength: poor load transfer  Scarring: none  Pelvic Floor Muscle and Transverse Abdominus Synergy: absent  Diastasis: present above umbilicus 2-3 fingers     BREATHING MECHANICS ASSESSMENT   Thorax Assessment During Quiet Respiration: Decreased excursion of  abdominal wall  and Decreased excursion bilaterally of lateral ribs   Thorax Assessment During Deep Respiration: Decreased excursion of abdominal wall  and Decreased excursion bilaterally of lateral ribs     RECTAL PELVIC FLOOR EXAM    EXTERNAL ASSESSMENT  Anus: WNL  Skin condition: WNL   Scarring: none  Sensation: WNL   Pain: none  Voluntary contraction: present  Comments: pelvic floor mm assessment performed with TPUS showing fair activation, slow deactivation and overactivity at deep layers      Limitation/Restriction for FOTO Urinary Survey    Therapist reviewed FOTO scores for Ephraim Coyle on 12/30/2022.   FOTO documents entered into [employer] - see Media section.    Limitation Score: TBD%       TREATMENT     Treatment Time In: 1135  Treatment Time Out: 1200  Total Treatment time (time-based codes) separate from Evaluation: 25 minutes    Neuromuscular Re-education to develop Coordination, Control, and Proprioception for 15 minutes including:   RUSI for breathing, drops, contractions of the PFM to help pt visualize PFM motion and function. TPUS - complete relaxation, increased activation at superficial mm layers    Therapeutic Activity Patient participated in dynamic functional therapeutic activities to improve functional performance for 10 minutes. Including: Education as described below.  Patient Education provided:   general anatomy/physiology of urinary/ bowel  system and benefits of treatment were discussed with the pt. Additionally, anatomy/physiology of pelvic floor, posture/body mechanices, diaphragmatic breathing, kegels, and behavior modifications were reviewed.     Home Exercises provided:  Written Home Exercises provided: Yes  Exercises were reviewed and Ephraim was able to demonstrate them prior to the end of the session.    Ephraim demonstrated good  understanding of the education provided.     See EMR under Patient Instructions for exercises provided 12/30/2022.    Assessment     Ephraim is a 72 y.o. male  referred to outpatient Physical Therapy with a medical diagnosis of post void dribbling. Pt presents with altered posture, poor knowledge of body mechanics and posture, adhered abdominal scar, poor trunk stability, decreased pelvic muscle strength, decreased endurance of the pelvic muscles, poor coordination of pelvic floor muscles during ADL's leading to urinary or fecal leakage, and poor fluid intake. Pelvic floor mm assessment performed with TPUS. Initially excessive contraction at deep layer but improved ability to contract bulbospongiosus and JANETH with cuing and PFME initiated. He will also benefit from core strengthening, posture education, and potentially bladder diary prescription.      Pt prognosis is Excellent  Pt will benefit from skilled outpatient Physical Therapy to address the deficits stated above and in the chart below, provide pt/family education, and to maximize pt's level of independence.     Plan of care discussed with patient: Yes  Pt's spiritual, cultural and educational needs considered and patient is agreeable to the plan of care and goals as stated below:     Anticipated Barriers for therapy: None    Medical Necessity is demonstrated by the following:    History  Co-morbidities and personal factors that may impact the plan of care Co-morbidities   history of cancer and prior abdominal surgery    Personal Factors  no deficits     moderate   Examination  Body structures and functions, activity limitations and participation restrictions that may impact the plan of care Body Regions/Systems/Functions:  altered posture, poor knowledge of body mechanics and posture, adhered abdominal scar, poor trunk stability, decreased pelvic muscle strength, decreased endurance of the pelvic muscles, poor coordination of pelvic floor muscles during ADL's leading to urinary or fecal leakage, and poor fluid intake.    Activity Limitations:  sleep uninterrupted by excessive nocturia, difficulty getting an erection  , and incontinence with ADLs    Participation Restrictions:  all ADLs/iADLs uninterrupted by urinary incontinence/urgency/frequency and relationship with spouse/partner    Activity limitations:   Learning and applying knowledge  No deficits    General Tasks and Commands  No deficits    Communication  No deficits    Mobility  No deficits    Self care  No deficits    Domestic Life  No deficits    Interactions/Relationships  No deficits    Life Areas  No deficits    Community and Social Life  No deficits       high   Clinical Presentation evolving clinical presentation with changing clinical characteristics moderate   Decision Making/ Complexity Score: moderate       Goals:  Short Term Goals: 4 weeks   Pt indep in HEP  Pt able to wait at least 2.5 hours between trips to the bathroom for improved participation in ADLs  Pt demo pelvic floor mm strength at least 3/5 for improved continence and support of pelvic organs    Long Term Goals: 8 weeks   Pt indep in progressive HEP  Pt reports 0 episodes of leakage in at least 2 weeks for improved ADL participation  Pt able to wait at least 3-4 hours between trips to the bathroom   Nocturia no more than 1x/night for improved quality of sleep  Pt reports able to maintain erection at at least 50% hardness (enough to complete vaginal intercourse) on at least 75% of attempts  Pt demo pelvic floor mm strength at least 3+/5 for improved continence and support of pelvic organs    Plan     Plan of care Certification: 12/30/2022 to 3/28/23.    Outpatient Physical Therapy 1 times weekly for 12 weeks to include the following interventions: therapeutic exercises, therapeutic activity, neuromuscular re-education, manual therapy, patient/family education and self care/home management    Radha Ritchie, PT

## 2023-03-22 ENCOUNTER — OFFICE VISIT (OUTPATIENT)
Dept: UROLOGY | Facility: CLINIC | Age: 73
End: 2023-03-22
Payer: OTHER GOVERNMENT

## 2023-03-22 ENCOUNTER — LAB VISIT (OUTPATIENT)
Dept: LAB | Facility: HOSPITAL | Age: 73
End: 2023-03-22
Attending: STUDENT IN AN ORGANIZED HEALTH CARE EDUCATION/TRAINING PROGRAM
Payer: OTHER GOVERNMENT

## 2023-03-22 VITALS — WEIGHT: 225.63 LBS | BODY MASS INDEX: 31.47 KG/M2

## 2023-03-22 DIAGNOSIS — R10.32 LEFT LOWER QUADRANT ABDOMINAL PAIN: ICD-10-CM

## 2023-03-22 DIAGNOSIS — N39.8 VOIDING DYSFUNCTION: Primary | ICD-10-CM

## 2023-03-22 DIAGNOSIS — N52.9 ERECTILE DYSFUNCTION, UNSPECIFIED ERECTILE DYSFUNCTION TYPE: ICD-10-CM

## 2023-03-22 DIAGNOSIS — N40.1 BPH ASSOCIATED WITH NOCTURIA: ICD-10-CM

## 2023-03-22 DIAGNOSIS — R35.1 BPH ASSOCIATED WITH NOCTURIA: ICD-10-CM

## 2023-03-22 LAB — COMPLEXED PSA SERPL-MCNC: 0.49 NG/ML (ref 0–4)

## 2023-03-22 PROCEDURE — 84153 ASSAY OF PSA TOTAL: CPT | Performed by: STUDENT IN AN ORGANIZED HEALTH CARE EDUCATION/TRAINING PROGRAM

## 2023-03-22 PROCEDURE — 99999 PR PBB SHADOW E&M-EST. PATIENT-LVL IV: ICD-10-PCS | Mod: PBBFAC,,, | Performed by: STUDENT IN AN ORGANIZED HEALTH CARE EDUCATION/TRAINING PROGRAM

## 2023-03-22 PROCEDURE — 99214 OFFICE O/P EST MOD 30 MIN: CPT | Mod: PBBFAC | Performed by: STUDENT IN AN ORGANIZED HEALTH CARE EDUCATION/TRAINING PROGRAM

## 2023-03-22 PROCEDURE — 99999 PR PBB SHADOW E&M-EST. PATIENT-LVL IV: CPT | Mod: PBBFAC,,, | Performed by: STUDENT IN AN ORGANIZED HEALTH CARE EDUCATION/TRAINING PROGRAM

## 2023-03-22 PROCEDURE — 99214 OFFICE O/P EST MOD 30 MIN: CPT | Mod: S$PBB,,, | Performed by: STUDENT IN AN ORGANIZED HEALTH CARE EDUCATION/TRAINING PROGRAM

## 2023-03-22 PROCEDURE — 36415 COLL VENOUS BLD VENIPUNCTURE: CPT | Performed by: STUDENT IN AN ORGANIZED HEALTH CARE EDUCATION/TRAINING PROGRAM

## 2023-03-22 PROCEDURE — 99214 PR OFFICE/OUTPT VISIT, EST, LEVL IV, 30-39 MIN: ICD-10-PCS | Mod: S$PBB,,, | Performed by: STUDENT IN AN ORGANIZED HEALTH CARE EDUCATION/TRAINING PROGRAM

## 2023-03-22 RX ORDER — LIDOCAINE 50 MG/G
OINTMENT TOPICAL
COMMUNITY
Start: 2023-01-18

## 2023-03-22 RX ORDER — LIDOCAINE 50 MG/G
PATCH TOPICAL
COMMUNITY
Start: 2023-01-18

## 2023-03-22 RX ORDER — ALLOPURINOL 300 MG/1
150 TABLET ORAL
COMMUNITY
Start: 2022-11-04

## 2023-03-22 RX ORDER — TADALAFIL 20 MG/1
20 TABLET ORAL DAILY
Qty: 30 TABLET | Refills: 11 | Status: SHIPPED | OUTPATIENT
Start: 2023-03-22 | End: 2024-03-21

## 2023-03-22 RX ORDER — FLUTICASONE PROPIONATE 50 MCG
SPRAY, SUSPENSION (ML) NASAL
COMMUNITY
Start: 2022-06-06

## 2023-03-22 RX ORDER — FINASTERIDE 5 MG/1
5 TABLET, FILM COATED ORAL
COMMUNITY
Start: 2022-06-06 | End: 2023-03-22

## 2023-03-22 RX ORDER — IBUPROFEN 600 MG/1
1 TABLET ORAL EVERY 6 HOURS PRN
COMMUNITY
Start: 2023-02-23

## 2023-03-22 RX ORDER — CETIRIZINE HYDROCHLORIDE 10 MG/1
10 TABLET ORAL
COMMUNITY
Start: 2022-11-30

## 2023-03-22 RX ORDER — HYDROCODONE BITARTRATE AND ACETAMINOPHEN 5; 325 MG/1; MG/1
1 TABLET ORAL NIGHTLY PRN
COMMUNITY
Start: 2023-02-23

## 2023-03-22 NOTE — PROGRESS NOTES
Patient ID: Ephraim Coyle is a 72 y.o. male.    Chief Complaint: Follow-up (Pt states he is interested in [facility].)    Referral :      HPI  73 yo man w/ hx of OAB/BPH/LUTS. Urodynamics pre op revealed OAB, patient underwent HOLEP- has strong urinary stream but is bothered by OAB symptoms. He voids q 45mins to 1 hr during the day and is up 3-4 x per night. He has tried oxybutynin 5mg without success. He did not complete PFPT post HOLEP.     Denies dysuria, flank pain, hematuria, nausea, vomiting    Has ED on Viagra, would like assistance with maintaining erections  Denies intake of bladder irritants      ROS  Medically necessary ROS documented in the HPI    Past Medical History  Active Ambulatory Problems     Diagnosis Date Noted    Voiding dysfunction 10/08/2021    BPH associated with nocturia 03/22/2022    Pelvic floor weakness, male 01/03/2023    Posture abnormality 01/03/2023     Resolved Ambulatory Problems     Diagnosis Date Noted    No Resolved Ambulatory Problems     Past Medical History:   Diagnosis Date    Acid reflux     H/O colonoscopy     Hyperlipidemia     Hypertension     RAQUEL on CPAP     Sleep apnea          Past Surgical History  Past Surgical History:   Procedure Laterality Date    CYSTOSCOPY WITH URODYNAMIC TESTING N/A 10/8/2021    Procedure: CYSTOSCOPY, WITH URODYNAMIC TESTING;  Surgeon: Marine Kenny MD;  Location: [facility] OR;  Service: Urology;  Laterality: N/A;  URO TECH DOREEN BOOTH TEXTED  HER @ 1:13PM ON 10-6-2021  RN PREOP 10/5---COVID 10/5---NEGATIVE    LASER ENUCLEATION OF PROSTATE N/A 3/22/2022    Procedure: ENUCLEATION, PROSTATE, USING LASER;  Surgeon: Marine Kenny MD;  Location: [facility] OR;  Service: Urology;  Laterality: N/A;  RN PREOP 3/21/22,  They will do new consent in am (other one lost)  [facility] [phone] /  TALKED TO YOU ON 3/3/2022 @ 2:01PM CONFIRMATION #[id number]       Social History  Social Connections: Not on file       Medications    Current Outpatient Medications:      acetaminophen (TYLENOL) 500 MG tablet, Take 500 mg by mouth 3 (three) times daily., Disp: , Rfl:     allopurinoL (ZYLOPRIM) 300 MG tablet, 150 mg., Disp: , Rfl:     AMLODIPINE BESYLATE, BULK, MISC, 10 mg by Misc.(Non-Drug; Combo Route) route once. 1 tablet by mouth once daily, Disp: , Rfl:     atorvastatin (LIPITOR) 80 MG tablet, Take 80 mg by mouth once daily., Disp: , Rfl:     cetirizine (ZYRTEC) 10 MG tablet, 10 mg., Disp: , Rfl:     colchicine (COLCRYS) 0.6 mg tablet, Take 0.6 mg by mouth once daily., Disp: , Rfl:     diclofenac sodium (VOLTAREN) 1 % Gel, Apply 2 g topically once daily., Disp: , Rfl:     famotidine (PEPCID) 20 MG tablet, Take 20 mg by mouth 2 (two) times daily., Disp: , Rfl:     fluticasone propionate (FLONASE) 50 mcg/actuation nasal spray, INSTILL 2 SPRAYS IN EACH NOSTRIL EVERY DAY FOR ALLERGIES, Disp: , Rfl:     HYDROcodone-acetaminophen (NORCO) 5-325 mg per tablet, Take 1 tablet by mouth nightly as needed., Disp: , Rfl:     ibuprofen (ADVIL,MOTRIN) 600 MG tablet, Take 1 tablet by mouth every 6 (six) hours as needed., Disp: , Rfl:     LIDOcaine (LIDODERM) 5 %, APPLY 1 PATCH TOPICALLY EVERY DAY FOR PAIN WEAR FOR 12 HOURS, THEN REMOVE. DO NOT APPLY NEW PATCH FOR AT LEAST 12 HOURS, Disp: , Rfl:     LIDOcaine (XYLOCAINE) 5 % Oint ointment, APPLY SMALL AMOUNT TOPICALLY TWICE A DAY AS NEEDED FOR PAIN, Disp: , Rfl:     losartan (COZAAR) 100 MG tablet, Take 100 mg by mouth once daily. Take one half tablet by mouth, Disp: , Rfl:     oxybutynin (DITROPAN-XL) 5 MG TR24, Take 1 tablet (5 mg total) by mouth once daily., Disp: 30 tablet, Rfl: 11    sodium bicarbonate 650 MG tablet, Take 650 mg by mouth every 24 hours as needed for Heartburn., Disp: , Rfl:     UNKNOWN TO PATIENT, Daily. Pt thinks may be Colchicine for gout--just started it., Disp: , Rfl:     tadalafiL (CIALIS) 20 MG Tab, Take 1 tablet (20 mg total) by mouth once daily., Disp: 30 tablet, Rfl: 11    Allergies  Review of patient's allergies  indicates:  No Known Allergies    Patient's PMH, FH, Social hx, Medications, allergies reviewed and updated as pertinent to today's visit     Objective:      Physical Exam  Constitutional:       General: He is not in acute distress.     Appearance: He is well-developed. He is not ill-appearing, toxic-appearing or diaphoretic.   HENT:      Head: Normocephalic and atraumatic.      Mouth/Throat:      Mouth: Mucous membranes are moist.   Eyes:      Conjunctiva/sclera: Conjunctivae normal.   Pulmonary:      Effort: Pulmonary effort is normal. No respiratory distress.   Abdominal:      General: There is no distension.      Palpations: Abdomen is soft. There is no mass.      Tenderness: There is no abdominal tenderness. There is no right CVA tenderness, left CVA tenderness or guarding.   Musculoskeletal:         General: No swelling or deformity.      Cervical back: Neck supple.   Skin:     General: Skin is warm.      Capillary Refill: Capillary refill takes less than 2 seconds.      Findings: No rash.   Neurological:      Mental Status: He is alert and oriented to person, place, and time.      Gait: Gait normal.   Psychiatric:         Mood and Affect: Mood normal.         Thought Content: Thought content normal.         Judgment: Judgment normal.         Lab Results   Component Value Date    PSADIAG 0.49 03/22/2023    PSADIAG 0.55 06/15/2022    PSADIAG 1.4 03/15/2022        Assessment:       1. Voiding dysfunction    2. Erectile dysfunction, unspecified erectile dysfunction type    3. Left lower quadrant abdominal pain          Plan:     What is known about OAB was discussed with the patient including the benefits versus risks/burdens of the available treatment alternatives and the fact that acceptable symptom control may require trials of multiple therapeutic options before it is achieved. Discussed with patient OAB is a symptom complex that is not a life-threatening condition, acknowledged it can impair quality of life  for many patients. OAB treatment plan reviewed with patient    Discussed and recommended first Line therapy:   -Behavioral modification- pelvic floor retraining exercises ( which can be successful in up to 75% of patients) , limit known bladder irritants such as caffeine, soda, alcohol, use of a voiding diary to determine frequency of events.   -recommend completion of voiding diary, 3 days to better characterize patient's symptoms and optimize treatment plan efficacy  -Treatment of any underlying constipation with increased fiber, water intake.  - Timed voiding every 4-6 hours, if patient unable to hold urine every 4 hours, engaging in bladder retraining to reach this time frame in 0.5-1 hr interval, with anticipated increase in bladder storage habits.  - Drinking at least 8-10 glasses of water daily in addition to other beverages.  Limit intake of fluids to 6pm.  -*Discussed and recommended pelvic floor muscle therapy to strengthen weak pelvic floor muscles.    Discussed Second-Line Treatments:   Rx increased for oxybutynin 5 to 10mg, discussed risks/benefits  Pharmacologic Management  Discussed use of oral anti-muscarinics and oral ?3-adrenoceptor agonists. Antimuscarinics are associated with increased risk of dementia with long term usage, contraindicated in patients with history of narrow angle glaucoma, delayed gastric emptying. Discussed mirabegron can be associated with increased BP, monitoring at home is advised. Discussed virbegron may be costly compared to other agents, but does not have the same risk of elevated BP, need for BP monitoring as mirabegron    Discussed if conservative and pharmacotherapy should fail, plan to proceed to third-Line treatments:  - Botox (must be willing to accept self catheterization risk, PVR checks)  - PTNS ( non surgical intervention to stimulate the nerves that supply the bladder for relaxation)  - Sacral Neuromodulation- surgical intervention, spinal cord stimulation. Must  be willing to accept trial period prior to complete implantation     Tadalafil provided for ED, risks/benefits reviewed

## 2023-03-23 ENCOUNTER — DOCUMENTATION ONLY (OUTPATIENT)
Dept: UROLOGY | Facility: CLINIC | Age: 73
End: 2023-03-23
Payer: OTHER GOVERNMENT

## 2023-03-29 ENCOUNTER — HOSPITAL ENCOUNTER (OUTPATIENT)
Dept: RADIOLOGY | Facility: HOSPITAL | Age: 73
Discharge: HOME | End: 2023-03-29
Attending: STUDENT IN AN ORGANIZED HEALTH CARE EDUCATION/TRAINING PROGRAM
Payer: OTHER GOVERNMENT

## 2023-03-29 DIAGNOSIS — R10.32 LEFT LOWER QUADRANT ABDOMINAL PAIN: ICD-10-CM

## 2023-03-29 PROCEDURE — 74176 CT ABD & PELVIS W/O CONTRAST: CPT | Mod: 26,,, | Performed by: RADIOLOGY

## 2023-03-29 PROCEDURE — 74176 CT ABD & PELVIS W/O CONTRAST: CPT | Mod: TC

## 2023-03-29 PROCEDURE — 74176 CT RENAL STONE STUDY ABD PELVIS WO: ICD-10-PCS | Mod: 26,,, | Performed by: RADIOLOGY

## 2023-03-30 ENCOUNTER — TELEPHONE (OUTPATIENT)
Dept: UROLOGY | Facility: CLINIC | Age: 73
End: 2023-03-30
Payer: OTHER GOVERNMENT

## 2023-03-30 NOTE — PROGRESS NOTES
I have reviewed patient's imaging. He has a R renal stone, if he is having R flank pain, I can treat his stone and address his urinary frequency at the same time.

## 2023-03-30 NOTE — TELEPHONE ENCOUNTER
LM for pt to contact office.        ----- Message from Marine Kenny MD sent at 3/29/2023  8:28 PM CDT -----  I have reviewed patient's imaging. He has a R renal stone, if he is having R flank pain, I can treat his stone and address his urinary frequency at the same time.

## 2023-05-24 ENCOUNTER — OFFICE VISIT (OUTPATIENT)
Dept: UROLOGY | Facility: CLINIC | Age: 73
End: 2023-05-24
Payer: OTHER GOVERNMENT

## 2023-05-24 VITALS — WEIGHT: 226.06 LBS | BODY MASS INDEX: 31.53 KG/M2

## 2023-05-24 DIAGNOSIS — R39.9 LOWER URINARY TRACT SYMPTOMS (LUTS): Primary | ICD-10-CM

## 2023-05-24 DIAGNOSIS — Z98.890 HISTORY OF PROSTATE SURGERY: ICD-10-CM

## 2023-05-24 DIAGNOSIS — N52.9 ERECTILE DYSFUNCTION, UNSPECIFIED ERECTILE DYSFUNCTION TYPE: ICD-10-CM

## 2023-05-24 PROBLEM — N39.8 VOIDING DYSFUNCTION: Status: RESOLVED | Noted: 2021-10-08 | Resolved: 2023-05-24

## 2023-05-24 PROBLEM — M62.89 PELVIC FLOOR WEAKNESS, MALE: Status: RESOLVED | Noted: 2023-01-03 | Resolved: 2023-05-24

## 2023-05-24 PROBLEM — R35.1 BPH ASSOCIATED WITH NOCTURIA: Status: RESOLVED | Noted: 2022-03-22 | Resolved: 2023-05-24

## 2023-05-24 PROBLEM — N40.1 BPH ASSOCIATED WITH NOCTURIA: Status: RESOLVED | Noted: 2022-03-22 | Resolved: 2023-05-24

## 2023-05-24 PROCEDURE — 99214 OFFICE O/P EST MOD 30 MIN: CPT | Mod: PBBFAC | Performed by: STUDENT IN AN ORGANIZED HEALTH CARE EDUCATION/TRAINING PROGRAM

## 2023-05-24 PROCEDURE — 99999 PR PBB SHADOW E&M-EST. PATIENT-LVL IV: CPT | Mod: PBBFAC,,, | Performed by: STUDENT IN AN ORGANIZED HEALTH CARE EDUCATION/TRAINING PROGRAM

## 2023-05-24 PROCEDURE — 99214 PR OFFICE/OUTPT VISIT, EST, LEVL IV, 30-39 MIN: ICD-10-PCS | Mod: S$PBB,,, | Performed by: STUDENT IN AN ORGANIZED HEALTH CARE EDUCATION/TRAINING PROGRAM

## 2023-05-24 PROCEDURE — 51798 US URINE CAPACITY MEASURE: CPT | Mod: PBBFAC | Performed by: STUDENT IN AN ORGANIZED HEALTH CARE EDUCATION/TRAINING PROGRAM

## 2023-05-24 PROCEDURE — 99214 OFFICE O/P EST MOD 30 MIN: CPT | Mod: S$PBB,,, | Performed by: STUDENT IN AN ORGANIZED HEALTH CARE EDUCATION/TRAINING PROGRAM

## 2023-05-24 PROCEDURE — 99999 PR PBB SHADOW E&M-EST. PATIENT-LVL IV: ICD-10-PCS | Mod: PBBFAC,,, | Performed by: STUDENT IN AN ORGANIZED HEALTH CARE EDUCATION/TRAINING PROGRAM

## 2023-05-24 RX ORDER — ALLOPURINOL 300 MG/1
300 TABLET ORAL
COMMUNITY
Start: 2023-05-03

## 2023-05-24 RX ORDER — ACETAMINOPHEN 500 MG
1000 TABLET ORAL
COMMUNITY
Start: 2023-04-14

## 2023-05-24 RX ORDER — PREDNISONE 10 MG/1
60 TABLET ORAL
COMMUNITY
Start: 2023-05-23

## 2023-05-24 RX ORDER — FINASTERIDE 5 MG/1
5 TABLET, FILM COATED ORAL
COMMUNITY
Start: 2023-05-22

## 2023-05-24 RX ORDER — CLOBETASOL PROPIONATE 0.5 MG/G
OINTMENT TOPICAL
COMMUNITY
Start: 2023-05-05

## 2023-05-24 RX ORDER — LOSARTAN POTASSIUM 50 MG/1
50 TABLET ORAL
COMMUNITY
Start: 2023-04-25

## 2023-05-24 RX ORDER — FAMCICLOVIR 500 MG/1
500 TABLET ORAL
COMMUNITY
Start: 2023-05-23

## 2023-05-24 RX ORDER — ATORVASTATIN CALCIUM 80 MG/1
1 TABLET, FILM COATED ORAL DAILY
COMMUNITY
Start: 2023-05-03

## 2023-05-24 NOTE — PROGRESS NOTES
Patient ID: Ephraim Coyle is a 72 y.o. male.    Chief Complaint: Follow-up      HPI  72 y.o. who presents to the Urology clinic for evaluation of LUTS/ED. Patient notes resolution of LUTS w/ oxybutynin 5mg daily. He is able to hold his urine for up to 4 hrs from q 30mins/q1 hr previously. He is satisfied. Patient also noting ED is satisfactorily treated with 20mg Cialis PRN. ED predated prostate procedure.     He is s/p HOLEP for bladder outlet obstruction.   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    BPH associated with nocturia 03/22/2022    Pelvic floor weakness, male 01/03/2023    Posture abnormality 01/03/2023     Resolved Ambulatory Problems     Diagnosis Date Noted    No Resolved Ambulatory Problems     Past Medical History:   Diagnosis Date    Acid reflux     H/O colonoscopy     Hyperlipidemia     Hypertension     RAQUEL on CPAP     Sleep apnea          Past Surgical History  Past Surgical History:   Procedure Laterality Date    CYSTOSCOPY WITH URODYNAMIC TESTING N/A 10/8/2021    Procedure: CYSTOSCOPY, WITH URODYNAMIC TESTING;  Surgeon: Marine Kenny MD;  Location: [facility] OR;  Service: Urology;  Laterality: N/A;  URO TECH DOREEN BOOTH TEXTED  HER @ 1:13PM ON 10-6-2021  RN PREOP 10/5---COVID 10/5---NEGATIVE    LASER ENUCLEATION OF PROSTATE N/A 3/22/2022    Procedure: ENUCLEATION, PROSTATE, USING LASER;  Surgeon: Marine Kenny MD;  Location: [facility] OR;  Service: Urology;  Laterality: N/A;  RN PREOP 3/21/22,  They will do new consent in am (other one lost)  Formerly [facility] [phone] /  TALKED TO YOU ON 3/3/2022 @ 2:01PM CONFIRMATION #[id number]       Social History  Social Connections: Not on file       Medications    Current Outpatient Medications:     acetaminophen (TYLENOL) 500 MG tablet, Take 500 mg by mouth 3 (three) times daily., Disp: , Rfl:      acetaminophen (TYLENOL) 500 MG tablet, 1,000 mg., Disp: , Rfl:     allopurinoL (ZYLOPRIM) 300 MG tablet, 150 mg., Disp: , Rfl:     allopurinoL (ZYLOPRIM) 300 MG tablet, 300 mg., Disp: , Rfl:     AMLODIPINE BESYLATE, BULK, MISC, 10 mg by Misc.(Non-Drug; Combo Route) route once. 1 tablet by mouth once daily, Disp: , Rfl:     atorvastatin (LIPITOR) 80 MG tablet, Take 80 mg by mouth once daily., Disp: , Rfl:     atorvastatin (LIPITOR) 80 MG tablet, Take 1 tablet by mouth once daily., Disp: , Rfl:     cetirizine (ZYRTEC) 10 MG tablet, 10 mg., Disp: , Rfl:     clobetasol 0.05% (TEMOVATE) 0.05 % Oint, APPLY SMALL AMOUNT TOPICALLY TWICE A DAY FOR SKIN INFLAMMATION, Disp: , Rfl:     colchicine (COLCRYS) 0.6 mg tablet, Take 0.6 mg by mouth once daily., Disp: , Rfl:     diclofenac sodium (VOLTAREN) 1 % Gel, Apply 2 g topically once daily., Disp: , Rfl:     famciclovir (FAMVIR) 500 MG tablet, 500 mg., Disp: , Rfl:     famotidine (PEPCID) 20 MG tablet, Take 20 mg by mouth 2 (two) times daily., Disp: , Rfl:     finasteride (PROSCAR) 5 mg tablet, 5 mg., Disp: , Rfl:     fluticasone propionate (FLONASE) 50 mcg/actuation nasal spray, INSTILL 2 SPRAYS IN EACH NOSTRIL EVERY DAY FOR ALLERGIES, Disp: , Rfl:     HYDROcodone-acetaminophen (NORCO) 5-325 mg per tablet, Take 1 tablet by mouth nightly as needed., Disp: , Rfl:     ibuprofen (ADVIL,MOTRIN) 600 MG tablet, Take 1 tablet by mouth every 6 (six) hours as needed., Disp: , Rfl:     LIDOcaine (LIDODERM) 5 %, APPLY 1 PATCH TOPICALLY EVERY DAY FOR PAIN WEAR FOR 12 HOURS, THEN REMOVE. DO NOT APPLY NEW PATCH FOR AT LEAST 12 HOURS, Disp: , Rfl:     LIDOcaine (XYLOCAINE) 5 % Oint ointment, APPLY SMALL AMOUNT TOPICALLY TWICE A DAY AS NEEDED FOR PAIN, Disp: , Rfl:     losartan (COZAAR) 100 MG tablet, Take 100 mg by mouth once daily. Take one half tablet by mouth, Disp: , Rfl:     losartan (COZAAR) 50 MG tablet, Take 50 mg by mouth., Disp: , Rfl:     oxybutynin (DITROPAN-XL) 5 MG TR24, Take 1  tablet (5 mg total) by mouth once daily., Disp: 30 tablet, Rfl: 11    predniSONE (DELTASONE) 10 MG tablet, 60 mg. TAKE SIX TABLETS BY MOUTH ONCE DAILY FOR 5 DAYS, THEN TAKE FIVE TABLETS ONCE DAILY FOR 1 DAY, THEN TAKE FOUR TABLETS ONCE DAILY FOR 1 DAY, THEN TAKE THREE TABLETS ONCE DAILY FOR 1 DAY, THEN TAKE TWO TABLETS ONCE DAILY FOR 1 DAY, THEN TAKE ONE TABLET ONCE DAILY FOR 1 DAY SENORINEURAL HEARING LOSS 5/23/2023  [facility] 5/24/[facility]23     TAKE SIX TABLETS BY MOUTH ONCE DAILY FOR 5 DAYS, THEN TAKE FIVE TABLETS ONCE DAILY FOR 1 DAY, THEN TAKE FOUR TABLETS ONCE DAILY FOR 1 DAY, THEN TAKE THREE TABLETS ONCE DAILY FOR 1 DAY, THEN TAKE TWO TABLETS ONCE DAILY FOR 1 DAY, THEN TAKE ONE TABLET ONCE DAILY FOR 1 DAY SENORINEURAL HEARING LOSS 5/23/2023  [facility] 5/24/2023, Disp: , Rfl:     sodium bicarbonate 650 MG tablet, Take 650 mg by mouth every 24 hours as needed for Heartburn., Disp: , Rfl:     tadalafiL (CIALIS) 20 MG Tab, Take 1 tablet (20 mg total) by mouth once daily., Disp: 30 tablet, Rfl: 11    UNKNOWN TO PATIENT, Daily. Pt thinks may be Colchicine for gout--just started it., Disp: , Rfl:     Allergies  Review of patient's allergies indicates:  No Known Allergies    Patient's PMH, FH, Social hx, Medications, allergies reviewed and updated as pertinent to today's visit    Objective:      Physical Exam  Constitutional:       General: He is not in acute distress.     Appearance: He is well-developed. He is not ill-appearing, toxic-appearing or diaphoretic.   HENT:      Head: Normocephalic and atraumatic.      Mouth/Throat:      Mouth: Mucous membranes are moist.   Eyes:      Conjunctiva/sclera: Conjunctivae normal.   Pulmonary:      Effort: Pulmonary effort is normal. No respiratory distress.   Abdominal:      General: Abdomen is flat. There is no distension.      Palpations: Abdomen is soft. There is no mass.   Musculoskeletal:         General: No swelling or deformity.      Cervical back: Neck supple.   Skin:      General: Skin is warm.      Findings: No rash.   Neurological:      Mental Status: He is alert and oriented to person, place, and time.      Gait: Gait normal.   Psychiatric:         Mood and Affect: Mood normal.         Thought Content: Thought content normal.         Judgment: Judgment normal.           Lab Results   Component Value Date    PSADIAG 0.49 03/22/2023    PSADIAG 0.55 06/15/2022    PSADIAG 1.4 03/15/2022        Assessment:       1. Lower urinary tract symptoms (LUTS)    2. History of prostate surgery    3. Erectile dysfunction, unspecified erectile dysfunction type        Plan:         LUTS  Discussed weaning oxybutynin 5mg to QOD if needed    ED  Continue daily tadalafil  Provided [employer] coupon to assist with cost of medication

## 2023-05-25 LAB — POC RESIDUAL URINE VOLUME: 0 ML (ref 0–100)

## 2023-12-11 DIAGNOSIS — Z01.89 RADIOLOGICAL EXAMINATION, NOT ELSEWHERE CLASSIFIED: Primary | ICD-10-CM

## 2023-12-21 ENCOUNTER — HOSPITAL ENCOUNTER (OUTPATIENT)
Dept: RADIOLOGY | Facility: HOSPITAL | Age: 73
Discharge: HOME | End: 2023-12-21
Payer: OTHER GOVERNMENT

## 2023-12-21 DIAGNOSIS — Z01.89 RADIOLOGICAL EXAMINATION, NOT ELSEWHERE CLASSIFIED: ICD-10-CM

## 2023-12-21 PROCEDURE — 73700 CT LOWER EXTREMITY W/O DYE: CPT | Mod: 26,RT,, | Performed by: RADIOLOGY

## 2023-12-21 PROCEDURE — 73700 CT KNEE WITHOUT CONTRAST RIGHT W/MAKO PROTOCOL: ICD-10-PCS | Mod: 26,RT,, | Performed by: RADIOLOGY

## 2023-12-21 PROCEDURE — 73700 CT LOWER EXTREMITY W/O DYE: CPT | Mod: TC,RT

## 2024-06-19 ENCOUNTER — TELEPHONE (OUTPATIENT)
Dept: UROLOGY | Facility: CLINIC | Age: 74
End: 2024-06-19
Payer: OTHER GOVERNMENT

## 2024-06-19 DIAGNOSIS — N52.9 ERECTILE DYSFUNCTION, UNSPECIFIED ERECTILE DYSFUNCTION TYPE: ICD-10-CM

## 2024-06-19 RX ORDER — TADALAFIL 20 MG/1
20 TABLET ORAL
Qty: 30 TABLET | Refills: 0 | OUTPATIENT
Start: 2024-06-19

## 2024-06-19 NOTE — TELEPHONE ENCOUNTER
Pt was contacted pt informed he needs an annual office visit before refills can be given. Pt yanique scheduled.   ----- Message from Abdoul Srinivasan sent at 6/19/2024 10:34 AM CDT -----  Regarding: Self .[phone]   Type: RX Refill Request    Who Called: Self     Have you contacted your pharmacy: yes    Refill    RX Name and Strength:tadalafiL (CIALIS) 20 MG Tab    Preferred Pharmacy with phone number:.  [id number]  [place] - [address] [facility]  [address] [place] [address]  Phone: [phone] Fax: [phone]        Local or Mail Order: ..local     Would the patient rather a call back or a response via [facility]? Call back     Best Call Back Number:.[phone]      Additional Information:     Thank you.

## 2024-07-03 ENCOUNTER — OFFICE VISIT (OUTPATIENT)
Dept: UROLOGY | Facility: CLINIC | Age: 74
End: 2024-07-03
Payer: OTHER GOVERNMENT

## 2024-07-03 VITALS — WEIGHT: 204.94 LBS | BODY MASS INDEX: 28.58 KG/M2

## 2024-07-03 DIAGNOSIS — Z98.890 HISTORY OF PROSTATE SURGERY: Primary | ICD-10-CM

## 2024-07-03 DIAGNOSIS — N52.9 ERECTILE DYSFUNCTION, UNSPECIFIED ERECTILE DYSFUNCTION TYPE: ICD-10-CM

## 2024-07-03 PROCEDURE — 99999 PR PBB SHADOW E&M-EST. PATIENT-LVL III: CPT | Mod: PBBFAC,,, | Performed by: STUDENT IN AN ORGANIZED HEALTH CARE EDUCATION/TRAINING PROGRAM

## 2024-07-03 PROCEDURE — 99213 OFFICE O/P EST LOW 20 MIN: CPT | Mod: S$PBB,,, | Performed by: STUDENT IN AN ORGANIZED HEALTH CARE EDUCATION/TRAINING PROGRAM

## 2024-07-03 PROCEDURE — 99213 OFFICE O/P EST LOW 20 MIN: CPT | Mod: PBBFAC | Performed by: STUDENT IN AN ORGANIZED HEALTH CARE EDUCATION/TRAINING PROGRAM

## 2024-07-03 RX ORDER — TADALAFIL 20 MG/1
20 TABLET ORAL DAILY
Qty: 30 TABLET | Refills: 11 | Status: SHIPPED | OUTPATIENT
Start: 2024-07-03 | End: 2025-07-03

## 2024-07-03 NOTE — PROGRESS NOTES
Patient ID: Ephraim Coyle is a 73 y.o. male.    Chief Complaint: Follow-up, Annual Exam, and Medication Refill    Referral: No referring provider defined for this encounter.     HPI  73 y.o. who presents to the Urology clinic for evaluation of cialis refill. Patient voiding well, denies gross hematuria or retention s/p HOLEP.     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    BPH associated with nocturia 03/22/2022    Pelvic floor weakness, male 01/03/2023     Past Medical History:   Diagnosis Date    Acid reflux     H/O colonoscopy     Hyperlipidemia     Hypertension     RAQUEL on CPAP     Sleep apnea          Past Surgical History  Past Surgical History:   Procedure Laterality Date    CYSTOSCOPY WITH URODYNAMIC TESTING N/A 10/8/2021    Procedure: CYSTOSCOPY, WITH URODYNAMIC TESTING;  Surgeon: Marine Kenny MD;  Location: [facility] OR;  Service: Urology;  Laterality: N/A;  URO TECH DOREEN BOOTH TEXTED  HER @ 1:13PM ON 10-6-2021  RN PREOP 10/5---COVID 10/5---NEGATIVE    LASER ENUCLEATION OF PROSTATE N/A 3/22/2022    Procedure: ENUCLEATION, PROSTATE, USING LASER;  Surgeon: Marine Kenny MD;  Location: [facility] OR;  Service: Urology;  Laterality: N/A;  RN PREOP 3/21/22,  They will do new consent in am (other one lost)  [facility] [phone] /  TALKED TO YOU ON 3/3/2022 @ 2:01PM CONFIRMATION #[id number]       Social History       Medications    Current Outpatient Medications:     acetaminophen (TYLENOL) 500 MG tablet, Take 500 mg by mouth 3 (three) times daily., Disp: , Rfl:     acetaminophen (TYLENOL) 500 MG tablet, 1,000 mg., Disp: , Rfl:     allopurinoL (ZYLOPRIM) 300 MG tablet, 150 mg., Disp: , Rfl:     allopurinoL (ZYLOPRIM) 300 MG tablet, 300 mg., Disp: , Rfl:     AMLODIPINE BESYLATE, BULK, MISC, 10 mg by Misc.(Non-Drug;  Combo Route) route once. 1 tablet by mouth once daily, Disp: , Rfl:     atorvastatin (LIPITOR) 80 MG tablet, Take 80 mg by mouth once daily., Disp: , Rfl:     atorvastatin (LIPITOR) 80 MG tablet, Take 1 tablet by mouth once daily., Disp: , Rfl:     cetirizine (ZYRTEC) 10 MG tablet, 10 mg., Disp: , Rfl:     clobetasol 0.05% (TEMOVATE) 0.05 % Oint, APPLY SMALL AMOUNT TOPICALLY TWICE A DAY FOR SKIN INFLAMMATION, Disp: , Rfl:     colchicine (COLCRYS) 0.6 mg tablet, Take 0.6 mg by mouth once daily., Disp: , Rfl:     diclofenac sodium (VOLTAREN) 1 % Gel, Apply 2 g topically once daily., Disp: , Rfl:     famciclovir (FAMVIR) 500 MG tablet, 500 mg., Disp: , Rfl:     famotidine (PEPCID) 20 MG tablet, Take 20 mg by mouth 2 (two) times daily., Disp: , Rfl:     finasteride (PROSCAR) 5 mg tablet, 5 mg., Disp: , Rfl:     fluticasone propionate (FLONASE) 50 mcg/actuation nasal spray, INSTILL 2 SPRAYS IN EACH NOSTRIL EVERY DAY FOR ALLERGIES, Disp: , Rfl:     HYDROcodone-acetaminophen (NORCO) 5-325 mg per tablet, Take 1 tablet by mouth nightly as needed., Disp: , Rfl:     ibuprofen (ADVIL,MOTRIN) 600 MG tablet, Take 1 tablet by mouth every 6 (six) hours as needed., Disp: , Rfl:     LIDOcaine (LIDODERM) 5 %, APPLY 1 PATCH TOPICALLY EVERY DAY FOR PAIN WEAR FOR 12 HOURS, THEN REMOVE. DO NOT APPLY NEW PATCH FOR AT LEAST 12 HOURS, Disp: , Rfl:     LIDOcaine (XYLOCAINE) 5 % Oint ointment, APPLY SMALL AMOUNT TOPICALLY TWICE A DAY AS NEEDED FOR PAIN, Disp: , Rfl:     losartan (COZAAR) 100 MG tablet, Take 100 mg by mouth once daily. Take one half tablet by mouth, Disp: , Rfl:     losartan (COZAAR) 50 MG tablet, Take 50 mg by mouth., Disp: , Rfl:     predniSONE (DELTASONE) 10 MG tablet, 60 mg. TAKE SIX TABLETS BY MOUTH ONCE DAILY FOR 5 DAYS, THEN TAKE FIVE TABLETS ONCE DAILY FOR 1 DAY, THEN TAKE FOUR TABLETS ONCE DAILY FOR 1 DAY, THEN TAKE THREE TABLETS ONCE DAILY FOR 1 DAY, THEN TAKE TWO TABLETS ONCE DAILY FOR 1 DAY, THEN TAKE ONE TABLET  ONCE DAILY FOR 1 DAY SENORINEURAL HEARING LOSS 5/23/2023  [facility] 5/24/2023     TAKE SIX TABLETS BY MOUTH ONCE DAILY FOR 5 DAYS, THEN TAKE FIVE TABLETS ONCE DAILY FOR 1 DAY, THEN TAKE FOUR TABLETS ONCE DAILY FOR 1 DAY, THEN TAKE THREE TABLETS ONCE DAILY FOR 1 DAY, THEN TAKE TWO TABLETS ONCE DAILY FOR 1 DAY, THEN TAKE ONE TABLET ONCE DAILY FOR 1 DAY SENORINEURAL HEARING LOSS 5/23/2023  [facility] 5/24/2023, Disp: , Rfl:     sodium bicarbonate 650 MG tablet, Take 650 mg by mouth every 24 hours as needed for Heartburn., Disp: , Rfl:     UNKNOWN TO PATIENT, Daily. Pt thinks may be Colchicine for gout--just started it., Disp: , Rfl:     oxybutynin (DITROPAN-XL) 5 MG TR24, Take 1 tablet (5 mg total) by mouth once daily., Disp: 30 tablet, Rfl: 11    tadalafiL (CIALIS) 20 MG Tab, Take 1 tablet (20 mg total) by mouth once daily., Disp: 30 tablet, Rfl: 11    Allergies  Review of patient's allergies indicates:  No Known Allergies    Patient's PMH, FH, Social hx, Medications, allergies reviewed and updated as pertinent to today's visit    Objective:      Physical Exam  Constitutional:       General: He is not in acute distress.     Appearance: He is well-developed. He is not ill-appearing, toxic-appearing or diaphoretic.   HENT:      Head: Normocephalic and atraumatic.      Mouth/Throat:      Mouth: Mucous membranes are moist.   Eyes:      Conjunctiva/sclera: Conjunctivae normal.   Pulmonary:      Effort: Pulmonary effort is normal. No respiratory distress.   Abdominal:      General: Abdomen is flat. There is no distension.      Palpations: Abdomen is soft. There is no mass.      Tenderness: There is no right CVA tenderness or left CVA tenderness.   Musculoskeletal:         General: No swelling or deformity.      Cervical back: Neck supple.   Skin:     Findings: No rash.   Neurological:      Mental Status: He is alert and oriented to person, place, and time.      Gait: Gait normal.   Psychiatric:         Mood and Affect: Mood  normal.         Thought Content: Thought content normal.         Judgment: Judgment normal.             Lab Results   Component Value Date    PSADIAG 0.49 03/22/2023    PSADIAG 0.55 06/15/2022    PSADIAG 1.4 03/15/2022      Assessment:       1. History of prostate surgery    2. Erectile dysfunction, unspecified erectile dysfunction type        Plan:         ED  Cialis refill provided    [facility] if [facility] in past year and normal can  follow up in 1 year. If PSA greater than 1 will need MRI of prostate to eval for clinically significant lesion for prostate biopsy

## 2024-07-24 DIAGNOSIS — N52.9 ERECTILE DYSFUNCTION, UNSPECIFIED ERECTILE DYSFUNCTION TYPE: ICD-10-CM

## 2024-07-24 RX ORDER — TADALAFIL 20 MG/1
20 TABLET ORAL DAILY
Qty: 30 TABLET | Refills: 11 | Status: SHIPPED | OUTPATIENT
Start: 2024-07-24 | End: 2025-07-24

## 2024-07-24 NOTE — TELEPHONE ENCOUNTER
----- Message from Christiana Cortez sent at 7/24/2024 12:09 PM CDT -----  Regarding: Patient call back  .Type: Patient Call Back    Who called:self     What is the request in detail:would like to have a call from office in regards to medication-Will elaborate upon phone call     Can the clinic reply by [facility]?no     Would the patient rather a call back or a response via [facility]? Call     Best call back number:.[phone]      Additional Information:

## 2024-07-24 NOTE — TELEPHONE ENCOUNTER
----- Message from Christiana Cortez sent at 7/24/2024 12:09 PM CDT -----  Regarding: Patient call back  .Type: Patient Call Back    Who called:self     What is the request in detail:would like to have a call from office in regards to medication-Will elaborate upon phone call     Can the clinic reply by [facility]?no     Would the patient rather a call back or a response via [facility]? Call     Best call back number:.[phone]      Additional Information:

## 2024-08-05 NOTE — PLAN OF CARE
Pre-operative instructions, medication directives and pain scales reviewed with patient. All questions the patient had  were answered. Re-assurance about surgical procedure and day of surgery routine given as needed. The patient verbalized understanding of the pre-op instructions.   No

## 2024-08-23 ENCOUNTER — TELEPHONE (OUTPATIENT)
Dept: UROLOGY | Facility: CLINIC | Age: 74
End: 2024-08-23
Payer: OTHER GOVERNMENT

## 2024-08-23 NOTE — TELEPHONE ENCOUNTER
Patient notified of missing [facility].  Patient is requesting for the provider to order labs.  Will send message to provider.  PAMELA RIZO     ----- Message from Marine Kenny MD sent at 8/8/2024  3:24 PM CDT -----  I requested PSA labs, none are included in the uploaded documents, please notify patient  ----- Message -----  From: Betzy Bedoya LPN  Sent: 8/8/2024  12:07 PM CDT  To: Marine Kenny MD    The labs for this patient from [facility] of [facility] is scanned to .  WJ LPN

## 2024-12-03 ENCOUNTER — OFFICE VISIT (OUTPATIENT)
Dept: FAMILY MEDICINE | Facility: CLINIC | Age: 74
End: 2024-12-03
Payer: OTHER GOVERNMENT

## 2024-12-03 ENCOUNTER — HOSPITAL ENCOUNTER (OUTPATIENT)
Dept: RADIOLOGY | Facility: HOSPITAL | Age: 74
Discharge: HOME | End: 2024-12-03
Payer: OTHER GOVERNMENT

## 2024-12-03 VITALS
RESPIRATION RATE: 18 BRPM | TEMPERATURE: 98 F | BODY MASS INDEX: 29.04 KG/M2 | DIASTOLIC BLOOD PRESSURE: 78 MMHG | WEIGHT: 207.44 LBS | SYSTOLIC BLOOD PRESSURE: 134 MMHG | HEART RATE: 75 BPM | HEIGHT: 71 IN | OXYGEN SATURATION: 95 %

## 2024-12-03 DIAGNOSIS — R05.2 SUBACUTE COUGH: Primary | ICD-10-CM

## 2024-12-03 DIAGNOSIS — R05.2 SUBACUTE COUGH: ICD-10-CM

## 2024-12-03 PROCEDURE — 99999 PR PBB SHADOW E&M-EST. PATIENT-LVL V: CPT | Mod: PBBFAC,,,

## 2024-12-03 PROCEDURE — 99204 OFFICE O/P NEW MOD 45 MIN: CPT | Mod: S$PBB,,,

## 2024-12-03 PROCEDURE — 99215 OFFICE O/P EST HI 40 MIN: CPT | Mod: PBBFAC,25,PO

## 2024-12-03 PROCEDURE — 71046 X-RAY EXAM CHEST 2 VIEWS: CPT | Mod: 26,,, | Performed by: RADIOLOGY

## 2024-12-03 PROCEDURE — 71046 X-RAY EXAM CHEST 2 VIEWS: CPT | Mod: TC,FY

## 2024-12-03 RX ORDER — KETOCONAZOLE 20 MG/G
CREAM TOPICAL
COMMUNITY
Start: 2024-08-12

## 2024-12-03 RX ORDER — BENZONATATE 100 MG/1
100 CAPSULE ORAL 3 TIMES DAILY PRN
Qty: 30 CAPSULE | Refills: 0 | Status: SHIPPED | OUTPATIENT
Start: 2024-12-03 | End: 2024-12-13

## 2024-12-03 RX ORDER — GUAIFENESIN 600 MG/1
600 TABLET, EXTENDED RELEASE ORAL 2 TIMES DAILY
Qty: 20 TABLET | Refills: 0 | Status: SHIPPED | OUTPATIENT
Start: 2024-12-03 | End: 2024-12-13

## 2024-12-03 RX ORDER — ERGOCALCIFEROL 1.25 MG/1
CAPSULE ORAL
COMMUNITY
Start: 2024-03-14

## 2024-12-03 RX ORDER — PRAZOSIN HYDROCHLORIDE 1 MG/1
1 CAPSULE ORAL
COMMUNITY
Start: 2024-08-12

## 2024-12-03 NOTE — PROGRESS NOTES
Family Medicine     Patient name: Ephraim Coyle  MRN: [id number]  : 1950  PCP NAME: No, Primary Doctor    Active Problem List with Overview Notes    Diagnosis Date Noted    Posture abnormality 2023       History of Present Illness    Patient presents today with a persistent cough.    He reports a persistent cough lasting over a month, producing mucus-like substance. The cough worsens when lying down and disrupts sleep. He denies shortness of breath, chest pain, or congestion. He has used NyQuil occasionally before sleep for symptom management but has not tried any expectorants.  He reports having a cold around this time of the year    He reports experiencing acid reflux, currently managed with Pepcid.    He lives with his wife, who is not experiencing any coughing symptoms.  He is also not in contact with anybody who is chronically coughing.  He quit smoking 38 years ago and denies current smoking.      ROS:  General: -fever, -chills, -fatigue, -weight gain, -weight loss  Eyes: -vision changes, -redness, -discharge  ENT: -ear pain, -nasal congestion, -sore throat  Cardiovascular: -chest pain, -palpitations, -lower extremity edema  Respiratory: +cough, -shortness of breath  Gastrointestinal: -abdominal pain, -nausea, -vomiting, -diarrhea, -constipation, -blood in stool, +heartburn  Genitourinary: -dysuria, -hematuria, -frequency  Musculoskeletal: -joint pain, -muscle pain  Skin: -rash, -lesion  Neurological: -headache, -dizziness, -numbness, -tingling  Psychiatric: -anxiety, -depression, -sleep difficulty          Past Medical History:   Diagnosis Date    Acid reflux     BPH associated with nocturia 3/22/2022    H/O colonoscopy     Hyperlipidemia     Hypertension     RAQUEL on CPAP     at times    Pelvic floor weakness, male 1/3/2023    Sleep apnea     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       Past Surgical History:   Procedure Laterality Date    CYSTOSCOPY WITH URODYNAMIC TESTING N/A 10/8/2021     "Procedure: CYSTOSCOPY, WITH URODYNAMIC TESTING;  Surgeon: Marine Kenny MD;  Location: [facility] OR;  Service: Urology;  Laterality: N/A;  URO TECH DOREEN BOOTH TEXTED  HER @ 1:13PM ON 10-6-2021  RN PREOP 10/5---COVID 10/5---NEGATIVE    LASER ENUCLEATION OF PROSTATE N/A 3/22/2022    Procedure: ENUCLEATION, PROSTATE, USING LASER;  Surgeon: Marine Kenny MD;  Location: [facility] OR;  Service: Urology;  Laterality: N/A;  RN PREOP 3/21/22,  They will do new consent in am (other one lost)  [facility] [phone] /  TALKED TO YOU ON 3/3/2022 @ 2:01PM CONFIRMATION #[id number]        No family history on file.     Social History     Socioeconomic History    Marital status:    Tobacco Use    Smoking status: Former    Smokeless tobacco: Never    Tobacco comments:     quit 31 yrs ago   Substance and Sexual Activity    Alcohol use: Not Currently    Drug use: Never    Sexual activity: Yes     Partners: Female       /78   Pulse 75   Temp 97.6 °F (36.4 °C) (Oral)   Resp 18   Ht 5' 11" (1.803 m)   Wt 94.1 kg (207 lb 7.3 oz)   SpO2 95%   BMI 28.93 kg/m²     Physical Exam    General: No acute distress. Well-developed. Well-nourished.  Eyes: EOMI. Sclerae anicteric.  HENT: Normocephalic. Atraumatic. Nares patent. Moist oral mucosa.  Ears: Bilateral TMs clear. Bilateral EACs clear.  Cardiovascular: Regular rate. Regular rhythm. No murmurs. No rubs. No gallops. Normal S1, S2.  Respiratory: Normal respiratory effort. Clear to auscultation bilaterally. No rales. No rhonchi. No wheezing.  Abdomen: Soft. Non-tender. Non-distended. Normoactive bowel sounds.  Musculoskeletal: No  obvious deformity.  Extremities: No lower extremity edema.  Neurological: Alert & oriented x3. No slurred speech. Normal gait.  Psychiatric: Normal mood. Normal affect. Good insight. Good judgment.  Skin: Warm. Dry. No rash.            Assessment & Plan           Ephraim was seen today for subacute cough    Diagnoses and all orders for this " visit:    Subacute cough  Likely due to resolving bronchitis.  Reflux etiology unlikely as he is already on H2 antagonist.  Patient did not cough throughout the entirety of clinic encounter  Continue conservative management with expectorants and cough suppressant  -     benzonatate (TESSALON) 100 MG capsule; Take 1 capsule (100 mg total) by mouth 3 (three) times daily as needed for Cough.  -     guaiFENesin (MUCINEX) 600 mg 12 hr tablet; Take 1 tablet (600 mg total) by mouth 2 (two) times daily. for 10 days  -     X-Ray Chest PA And Lateral; Future         Problem List Items Addressed This Visit    None  Visit Diagnoses       Subacute cough    -  Primary    Relevant Medications    benzonatate (TESSALON) 100 MG capsule    guaiFENesin (MUCINEX) 600 mg 12 hr tablet    Other Relevant Orders    X-Ray Chest PA And Lateral              Medication List with Changes/Refills   New Medications    BENZONATATE (TESSALON) 100 MG CAPSULE    Take 1 capsule (100 mg total) by mouth 3 (three) times daily as needed for Cough.    GUAIFENESIN (MUCINEX) 600 MG 12 HR TABLET    Take 1 tablet (600 mg total) by mouth 2 (two) times daily. for 10 days   Current Medications    ACETAMINOPHEN (TYLENOL) 500 MG TABLET    Take 500 mg by mouth 3 (three) times daily.    ACETAMINOPHEN (TYLENOL) 500 MG TABLET    1,000 mg.    ALLOPURINOL (ZYLOPRIM) 300 MG TABLET    150 mg.    AMLODIPINE BESYLATE, BULK, MISC    10 mg by Misc.(Non-Drug; Combo Route) route once. 1 tablet by mouth once daily    ATORVASTATIN (LIPITOR) 80 MG TABLET    Take 80 mg by mouth once daily.    ATORVASTATIN (LIPITOR) 80 MG TABLET    Take 1 tablet by mouth once daily.    CETIRIZINE (ZYRTEC) 10 MG TABLET    10 mg.    CLOBETASOL 0.05% (TEMOVATE) 0.05 % OINT    APPLY SMALL AMOUNT TOPICALLY TWICE A DAY FOR SKIN INFLAMMATION    COLCHICINE (COLCRYS) 0.6 MG TABLET    Take 0.6 mg by mouth once daily.    DICLOFENAC SODIUM (VOLTAREN) 1 % GEL    Apply 2 g topically once daily.    ERGOCALCIFEROL  (ERGOCALCIFEROL) 50,000 UNIT CAP    Take by mouth.    FAMCICLOVIR (FAMVIR) 500 MG TABLET    500 mg.    FAMOTIDINE (PEPCID) 20 MG TABLET    Take 20 mg by mouth 2 (two) times daily.    FINASTERIDE (PROSCAR) 5 MG TABLET    5 mg.    FLUTICASONE PROPIONATE (FLONASE) 50 MCG/ACTUATION NASAL SPRAY    INSTILL 2 SPRAYS IN EACH NOSTRIL EVERY DAY FOR ALLERGIES    HYDROCODONE-ACETAMINOPHEN (NORCO) 5-325 MG PER TABLET    Take 1 tablet by mouth nightly as needed.    IBUPROFEN (ADVIL,MOTRIN) 600 MG TABLET    Take 1 tablet by mouth every 6 (six) hours as needed.    KETOCONAZOLE (NIZORAL) 2 % CREAM    Apply topically.    LOSARTAN (COZAAR) 100 MG TABLET    Take 100 mg by mouth once daily. Take one half tablet by mouth    LOSARTAN (COZAAR) 50 MG TABLET    Take 50 mg by mouth.    PRAZOSIN (MINIPRESS) 1 MG CAP    Take 1 mg by mouth.    TADALAFIL (CIALIS) 20 MG TAB    Take 1 tablet (20 mg total) by mouth once daily.    UNKNOWN TO PATIENT    Daily. Pt thinks may be Colchicine for gout--just started it.   Discontinued Medications    ALLOPURINOL (ZYLOPRIM) 300 MG TABLET    300 mg.         Follow up for Worsening symptoms.    Kisha Mckenzie MD        This note was generated with the assistance of ambient listening technology. Verbal consent was obtained by the patient and accompanying visitor(s) for the recording of patient appointment to facilitate this note. I attest to having reviewed and edited the generated note for accuracy, though some syntax or spelling errors may persist. Please contact the author of this note for any clarification.

## 2024-12-03 NOTE — PROGRESS NOTES
Health Maintenance Due   Topic     Hepatitis C Screening  Consult pcp    Lipid Panel  Consult pcp    RSV Vaccine (Age 60+ and Pregnant patients) (1 - 1-dose 75+ series) Not offered at this Facility

## 2025-05-01 DIAGNOSIS — Z00.8 ENCOUNTER FOR OTHER GENERAL EXAMINATION: Primary | ICD-10-CM

## 2025-05-14 ENCOUNTER — OFFICE VISIT (OUTPATIENT)
Dept: PODIATRY | Facility: CLINIC | Age: 75
End: 2025-05-14
Payer: OTHER GOVERNMENT

## 2025-05-14 VITALS — WEIGHT: 207.44 LBS | HEIGHT: 71 IN | BODY MASS INDEX: 29.04 KG/M2

## 2025-05-14 DIAGNOSIS — M21.372 LEFT FOOT DROP: ICD-10-CM

## 2025-05-14 DIAGNOSIS — M79.671 RIGHT FOOT PAIN: Primary | ICD-10-CM

## 2025-05-14 PROCEDURE — 99999 PR PBB SHADOW E&M-EST. PATIENT-LVL V: CPT | Mod: PBBFAC,,, | Performed by: PODIATRIST

## 2025-05-14 PROCEDURE — 99215 OFFICE O/P EST HI 40 MIN: CPT | Mod: PBBFAC,PO | Performed by: PODIATRIST

## 2025-05-14 PROCEDURE — 99204 OFFICE O/P NEW MOD 45 MIN: CPT | Mod: S$PBB,,, | Performed by: PODIATRIST

## 2025-05-15 NOTE — PROGRESS NOTES
Subjective:     Patient ID: Ephraim Coyle is a 74 y.o. male.    Chief Complaint: Diabetic Foot Exam (12/3/24 Dr Osvaldo Hung) and Foot Pain (Bilateral )    Ephraim is a 74 y.o. male who presents to the podiatry clinic  with complaint of  bilateral foot pain. Onset of the symptoms was several months ago. Precipitating event: none known. Current symptoms include: ability to bear weight, but with some pain. Aggravating factors: any weight bearing. Symptoms have progressed to a point and plateaued. Patient has had no prior foot problems. Evaluation to date: none. Treatment to date: none. Reports left foot weakness    Review of Systems   Constitutional: Negative for chills.   Cardiovascular:  Negative for chest pain and claudication.   Respiratory:  Negative for cough.    Skin:  Positive for color change, dry skin and nail changes.   Musculoskeletal:  Positive for joint pain.   Gastrointestinal:  Negative for nausea.   Neurological:  Positive for paresthesias. Negative for numbness.   Psychiatric/Behavioral:  The patient is not nervous/anxious.         Objective:     Physical Exam  Constitutional:       Appearance: He is well-developed.      Comments: Oriented to time, place, and person.   Cardiovascular:      Comments: DP and PT pulses are palpable bilaterally. 3 sec capillary refill time and toes and feet are warm to touch proximally .  There is  hair growth on the feet and toes b/l. There is no edema b/l. No spider veins or varicosities present b/l.     Musculoskeletal:      Comments: Equinus noted b/l ankles with < 10 deg DF noted. MMT 4/5 left foot  in DF/PF/Inv/Ev resistance with no reproduction of pain in any direction. Passive range of motion of ankle and pedal joints is painless b/l.    Pain on palpation plantar medial right  heel, no pain with ROM or MMT or medial and lateral compression of heel, - tinel's sign       Feet:      Right foot:      Skin integrity: No callus or dry skin.      Left foot:      Skin  integrity: No callus or dry skin.   Lymphadenopathy:      Comments: Negative lymphadenopathy bilateral popliteal fossa and tarsal tunnel.   Skin:     Comments: No open lesions, lacerations or wounds noted.Interdigital spaces clean, dry and intact b/l. No erythema noted to b/l foot.  Nails normal color and trophic qualities.     Neurological:      Mental Status: He is alert.      Comments: Light touch, proprioception, and sharp/dull sensation are all intact bilaterally. Protective threshold with the Laquey-Wienstein monofilament is intact bilaterally.    Psychiatric:         Behavior: Behavior is cooperative.         Assessment:      Encounter Diagnoses   Name Primary?    Right foot pain Yes    Left foot drop      Plan:     Ephraim was seen today for diabetic foot exam and foot pain.    Diagnoses and all orders for this visit:    Right foot pain  -     Ambulatory referral/consult to Podiatry  -     Ambulatory Referral/Consult to Physical Therapy; Future    Left foot drop  -     Ambulatory Referral/Consult to Physical Therapy; Future  -     Ambulatory referral/consult to Neurology; Future      I counseled the patient on his conditions, their implications and medical management.      Referral placed to neurology - left foot drop    Referral placed to PT    Discussed different treatment options for heel pain. including conservative and interventional.  I gave written and verbal instructions on heel cord stretching and this was demonstrated for the patient. Patient expressed understanding. Discussed wearing appropriate shoe gear and avoiding flats, slippers, sandals, barefoot, and sockfeet. Recommended arch supports. My recommendation for OTC supports is [employer], [employer] tennis shoes.       Patient instructed on adequate icing techniques. Patient should ice the affected area at least once per day x 10 minutes for 10 days . I advised the  patient that extra icing would also be beneficial to ensure adequate anti  inflammatory effect     Stretching handout dispensed to patient. Instructions on adequate stretching reviewed in clinic        RTC PRN

## 2025-06-03 ENCOUNTER — TELEPHONE (OUTPATIENT)
Dept: NEUROLOGY | Facility: CLINIC | Age: 75
End: 2025-06-03
Payer: OTHER GOVERNMENT

## (undated) DEVICE — SUPPORT ULNA NERVE PROTECTOR

## (undated) DEVICE — CATH BACTI GUARD 2W 20FR 30CC

## (undated) DEVICE — SYR ONLY LUER LOCK 20CC

## (undated) DEVICE — GOWN B1 X-LG X-LONG

## (undated) DEVICE — SEE MEDLINE ITEM 154981

## (undated) DEVICE — STRAP CATH ELASTIC HOOK&LOOP

## (undated) DEVICE — GLOVE SURGICAL LATEX SZ 6.5

## (undated) DEVICE — COVER OVERHEAD SURG LT BLUE

## (undated) DEVICE — COVER SNAP KAP 26IN

## (undated) DEVICE — SOL IRR NACL .9% 3000ML

## (undated) DEVICE — CONTAINER SPECIMEN STRL 4OZ

## (undated) DEVICE — CANISTER SUCTION 2 LTR

## (undated) DEVICE — CATH BLADDER/URETERAL 7FR

## (undated) DEVICE — CATH IV INTROCAN 18G X 1 1/4

## (undated) DEVICE — SET IRR URLGY 2LINE UNIV SPIKE

## (undated) DEVICE — SEE MEDLINE ITEM 157181

## (undated) DEVICE — SEE MEDLINE ITEM 152502

## (undated) DEVICE — TUBING FOR LABORIE PUMP

## (undated) DEVICE — GLOVE BIOGEL PI MICRO SZ 7

## (undated) DEVICE — BAG URINARY DRN 2000ML

## (undated) DEVICE — TUBING SUC UNIV W/CONN 12FT

## (undated) DEVICE — Device

## (undated) DEVICE — SEE L#152161

## (undated) DEVICE — ELECTRODE NEOTRODE II

## (undated) DEVICE — CATH INTROCAN CANN 18G 1-3/4IN

## (undated) DEVICE — UNDERGLOVE BIOGEL PI SZ 6.5 LF

## (undated) DEVICE — SOL 9P NACL IRR PIC IL

## (undated) DEVICE — SOL BETADINE 5%

## (undated) DEVICE — CATH URETERAL LASER 7.1FR 40CM

## (undated) DEVICE — SEE MEDLINE ITEM 157110

## (undated) DEVICE — SOL IRR STRL WATER 500ML

## (undated) DEVICE — DRAPE STERI LONG

## (undated) DEVICE — GUIDEWIRE ZIPWIRE .035 150CM L

## (undated) DEVICE — GAUZE SPONGE 4X4 12PLY

## (undated) DEVICE — TUBING ARTHRO IRR 4-LEAD

## (undated) DEVICE — CATH ABD 7FR

## (undated) DEVICE — BLANKET UPPER BODY 78.7X29.9IN

## (undated) DEVICE — SYR 50ML CATH TIP

## (undated) DEVICE — FIBER MOSES 550 DFL

## (undated) DEVICE — SOL NS 1000CC

## (undated) DEVICE — MAT QUICK 40X30 FLOOR FLUID LF

## (undated) DEVICE — SYS LABLNG CORECT MED 4 FLG